# Patient Record
Sex: MALE | Race: WHITE | Employment: UNEMPLOYED | URBAN - METROPOLITAN AREA
[De-identification: names, ages, dates, MRNs, and addresses within clinical notes are randomized per-mention and may not be internally consistent; named-entity substitution may affect disease eponyms.]

---

## 2017-07-19 ENCOUNTER — HOSPITAL ENCOUNTER (EMERGENCY)
Age: 62
Discharge: HOME | End: 2017-07-19
Attending: EMERGENCY MEDICINE
Payer: MEDICAID

## 2017-07-19 ENCOUNTER — APPOINTMENT (OUTPATIENT)
Dept: CT IMAGING | Age: 62
End: 2017-07-19
Attending: PHYSICIAN ASSISTANT
Payer: MEDICAID

## 2017-07-19 VITALS
RESPIRATION RATE: 14 BRPM | HEART RATE: 82 BPM | WEIGHT: 176.59 LBS | HEIGHT: 69 IN | OXYGEN SATURATION: 98 % | DIASTOLIC BLOOD PRESSURE: 81 MMHG | SYSTOLIC BLOOD PRESSURE: 109 MMHG | BODY MASS INDEX: 26.16 KG/M2 | TEMPERATURE: 97.5 F

## 2017-07-19 DIAGNOSIS — M50.30 BULGE OF CERVICAL DISC WITHOUT MYELOPATHY: ICD-10-CM

## 2017-07-19 DIAGNOSIS — M50.30 DEGENERATIVE DISC DISEASE, CERVICAL: ICD-10-CM

## 2017-07-19 DIAGNOSIS — F17.200 NICOTINE DEPENDENCE, UNCOMPLICATED, UNSPECIFIED NICOTINE PRODUCT TYPE: ICD-10-CM

## 2017-07-19 DIAGNOSIS — M54.2 NECK PAIN: Primary | ICD-10-CM

## 2017-07-19 PROCEDURE — 72125 CT NECK SPINE W/O DYE: CPT

## 2017-07-19 PROCEDURE — 74011250637 HC RX REV CODE- 250/637: Performed by: PHYSICIAN ASSISTANT

## 2017-07-19 PROCEDURE — 99283 EMERGENCY DEPT VISIT LOW MDM: CPT

## 2017-07-19 PROCEDURE — L0120 CERV FLEX N/ADJ FOAM PRE OTS: HCPCS

## 2017-07-19 RX ORDER — ONDANSETRON 4 MG/1
4 TABLET, ORALLY DISINTEGRATING ORAL
Status: COMPLETED | OUTPATIENT
Start: 2017-07-19 | End: 2017-07-19

## 2017-07-19 RX ORDER — DIAZEPAM 5 MG/1
5 TABLET ORAL
Status: COMPLETED | OUTPATIENT
Start: 2017-07-19 | End: 2017-07-19

## 2017-07-19 RX ORDER — METHOCARBAMOL 750 MG/1
750 TABLET, FILM COATED ORAL 3 TIMES DAILY
Qty: 15 TAB | Refills: 0 | Status: SHIPPED | OUTPATIENT
Start: 2017-07-19 | End: 2017-07-24

## 2017-07-19 RX ORDER — HYDROMORPHONE HYDROCHLORIDE 2 MG/1
2 TABLET ORAL ONCE
Status: COMPLETED | OUTPATIENT
Start: 2017-07-19 | End: 2017-07-19

## 2017-07-19 RX ADMIN — ONDANSETRON 4 MG: 4 TABLET, ORALLY DISINTEGRATING ORAL at 19:03

## 2017-07-19 RX ADMIN — HYDROMORPHONE HYDROCHLORIDE 2 MG: 2 TABLET ORAL at 19:03

## 2017-07-19 RX ADMIN — DIAZEPAM 5 MG: 5 TABLET ORAL at 19:03

## 2017-07-19 NOTE — ED NOTES
Patient stated that he has 10/10 neck pain that started around 2 pm on 7/19/17. He denies any action to cause the neck pain. Patient reports that it is painful to hold head upright. Patient takes Dilaudid 4 mg x 5 per day for chronic back pain. Lat dose of dilaudid was taken around 12 pm on 7/19/17.

## 2017-07-19 NOTE — ED PROVIDER NOTES
HPI Comments: Jocelin Burch, 58 y.o. male, with a history of multiple lumbar surgeries presents ambulatory to [address] [facility] ED with cc of stabbing neck pain x 14:00 today. Patient states his pain is not improved with Dilaudid 4 mg, five times a day, and notes that it is worse with any head movements. He reports no associated sxs with his neck pain. He specifically denies h/o surgeries on his neck or any recent injuries. Patient states his pain management specialist prescribes his Dilaudid. He did not take any Dilaudid PTA. Patient notes baseline LE numbness. Patient denies UE numbness, CP, SOB, abdominal pain, N/V/D, and ha. PCP: Sharla Turcios MD  Orthopedic surgeon: Du Park MD    PMHx significant for: HTN, arthritis, GERD, hepatitis, chronic back pain, depression, anxiety  PSHx significant for: lumbar spine surgery x 7  Social history significant for: + Tobacco, - (h/o abuse) EtOH, - Illicit Drug Use    There are no other complaints, changes, or physical findings at this time. Written by Scooter Burgos ED Scribe, as dictated by William Tan PA-C. The history is provided by the patient. No  was used.         Past Medical History:   Diagnosis Date    Arthritis     Chronic pain     GERD (gastroesophageal reflux disease)     Hypertension     Liver disease 1970'S    HX HEPATITIS    Nausea & vomiting     Psychiatric disorder     DEPRESSION AND ANXIETY       Past Surgical History:   Procedure Laterality Date    ENDOSCOPY VISIT-OUTPATIENT  2003    HX BACK SURGERY      LUMBAR SPINE X 7         Family History:   Problem Relation Age of Onset    Cancer Father      BRAIN    Heart Disease Maternal Aunt     Stroke Maternal Aunt     Heart Disease Maternal Uncle     Stroke Maternal Uncle     Cancer Paternal Uncle      PROSTATE    Stroke Maternal Grandmother     Cancer Paternal Grandmother      BREAST    Heart Disease Sister     Heart Disease Sister        Social History     Social History    Marital status:      Spouse name: N/A    Number of children: N/A    Years of education: N/A     Occupational History    Not on file. Social History Main Topics    Smoking status: Current Every Day Smoker     Packs/day: 1.00     Years: 40.00    Smokeless tobacco: Never Used    Alcohol use No      Comment: RECOVERING ALCOHOLIC    Drug use: No    Sexual activity: Not on file     Other Topics Concern    Not on file     Social History Narrative         ALLERGIES: Latex; Adhesive; Codeine; and Morphine    Review of Systems   Constitutional: Negative. Negative for chills and fever. HENT: Negative. Negative for rhinorrhea and sore throat. Eyes: Negative. Negative for visual disturbance. Respiratory: Negative. Negative for cough, chest tightness, shortness of breath and wheezing. Cardiovascular: Negative. Negative for chest pain and palpitations. Gastrointestinal: Negative. Negative for abdominal pain, constipation, diarrhea, nausea and vomiting. Genitourinary: Negative. Negative for dysuria and hematuria. Musculoskeletal: Positive for neck pain. Negative for arthralgias and myalgias. Skin: Negative. Negative for rash. Allergic/Immunologic: Negative. Negative for environmental allergies and food allergies. Neurological: Negative for dizziness and headaches. Psychiatric/Behavioral: Negative. Negative for suicidal ideas. Vitals:    07/19/17 1744   BP: 109/81   Pulse: 82   Resp: 14   Temp: 97.5 °F (36.4 °C)   SpO2: 98%   Weight: 80.1 kg (176 lb 9.4 oz)   Height: 5' 9\" (1.753 m)            Physical Exam   Constitutional: He is oriented to person, place, and time. He appears well-developed and well-nourished. No distress. Pt is a  M, awake and alert in NAD. HENT:   Head: Normocephalic and atraumatic.    Right Ear: Tympanic membrane, external ear and ear canal normal.   Left Ear: Tympanic membrane, external ear and ear canal normal.   Nose: Nose normal.   Mouth/Throat: Uvula is midline, oropharynx is clear and moist and mucous membranes are normal.   Eyes: Conjunctivae and EOM are normal. Right eye exhibits no discharge. Left eye exhibits no discharge. Pinpoint pupils b/l. Neck:   Diffuse midline and paraspinal TTP of cervical region. Palpable spasm b/l. Able to flex and extend the neck, able to hold up head. Decreased twisting side to side ROM secondary to pain. Extension and twisting ROM causes pain. Cardiovascular: Normal rate, normal heart sounds and intact distal pulses. Pulmonary/Chest: Effort normal and breath sounds normal. No respiratory distress. He has no wheezes. He has no rales. He exhibits no tenderness. Abdominal: Soft. Bowel sounds are normal. There is no tenderness. There is no guarding. No CVA tenderness b/l. Musculoskeletal: He exhibits tenderness. He exhibits no edema. See neck   Neurological: He is alert and oriented to person, place, and time. No focal neuro deficits. Neuro intact of UE and LE B/L, Sensation intact of UE. Strength 5/5 of UE B/L.   2+ radial pulses b/l. Skin: Skin is warm and dry. No rash noted. He is not diaphoretic. No erythema. No pallor. Psychiatric: He has a normal mood and affect. His behavior is normal.   Vitals reviewed.      MDM  Number of Diagnoses or Management Options  Bulge of cervical disc without myelopathy:   Degenerative disc disease, cervical:   Neck pain:   Nicotine dependence, uncomplicated, unspecified nicotine product type:   Diagnosis management comments:   Ddx: OA, DDD, bulging disc, muscle spasm, chronic pain, strain, sprain, acute torticollis       Amount and/or Complexity of Data Reviewed  Tests in the radiology section of CPT®: ordered and reviewed  Review and summarize past medical records: yes  Discuss the patient with other providers: yes (Attending physician)    Patient Progress  Patient progress: stable    ED Course       Procedures    Progress Note:  7:08 PM  Patient reevaluated and updated on place in CT queue. Patient states he feels fine at this time. No new complaints. Written by Natalie Hdez ED Scribe, as dictated by Esteban Palomares PA-C. Progress Note:  8:29 PM  Updated and counseled pt on results of CT. Written by Natalie Hdez ED Scribjorden, as dictated by Esteban Palomares PA-C. Consult Note:  8:32 PM  Esteban Palomares PA-C spoke with Lamont Segal MD,  Specialty: attending physician  Discussed pt's hx, disposition, and available diagnostic and imaging results. Reviewed care plans. Consultant agrees with plans as outlined. Dr. Enmanuel Yarbrough suggests referring the pt back to ortho and placing the pt in a soft CC. Written by LOBITO Rosa, as dictated by Esteban Palomares PA-C. Progress Note:  8:32 PM  Offered pt soft CC for discharge home. Patient agreeable with CC. Discussed precautions and advised pt to avoid mixing muscle relaxer with anxiolytic rx and additional pain medications. Patient expresses understanding and agrees with plan. Addressed questions. Advised follow up with ortho in 1-2 days. Written by LOBITO Rosaibe, as dictated by Esteban Palomares PA-C. IMAGING RESULTS:  CT Results  (Last 48 hours)               [birth year]/1955  CT SPINE CERV WO CONT Final result    Impression:  IMPRESSION:       1. No acute bony abnormality of the cervical spine. 2. Multilevel degenerative disc disease with focal central and left paracentral   bulge at C2-3.   3. Bulky spondylosis at C3-4 causing significant left foraminal and left lateral   recess stenosis and mild AP stenosis. Narrative:  EXAM:  CT CERVICAL SPINE WITHOUT CONTRAST       INDICATION:   neck pain x today, denies known injury. H/o chronic back pain. COMPARISON: None. CONTRAST:  None. TECHNIQUE: Multislice helical CT of the cervical spine was performed without   intravenous contrast administration. Sagittal and coronal reconstructions were   generated.   CT dose reduction was achieved through use of a standardized   protocol tailored for this examination and automatic exposure control for dose   modulation. FINDINGS:       The alignment is within normal limits. There is no fracture or subluxation. The   odontoid process is intact. The craniocervical junction is within normal limits. The prevertebral soft tissues are within normal limits. Multilevel spondylosis and bulge, with bulky spondylosis at C3-4, causing   significant left foraminal and left lateral recess stenosis with mild AP   stenosis. . Central and left paracentral focal bulge of disc material at C2-3. MEDICATIONS GIVEN:  Medications   ondansetron (ZOFRAN ODT) tablet 4 mg (4 mg Oral Given 7/19/17 1903)   diazePAM (VALIUM) tablet 5 mg (5 mg Oral Given 7/19/17 1903)   HYDROmorphone (DILAUDID) tablet 2 mg (2 mg Oral Given 7/19/17 1903)       IMPRESSION:  1. Neck pain    2. Degenerative disc disease, cervical    3. Bulge of cervical disc without myelopathy    4. Nicotine dependence, uncomplicated, unspecified nicotine product type        PLAN:  1. Current Discharge Medication List      START taking these medications    Details   methocarbamol (ROBAXIN-750) 750 mg tablet Take 1 Tab by mouth three (3) times daily for 5 days. Qty: 15 Tab, Refills: 0         CONTINUE these medications which have NOT CHANGED    Details   tamsulosin (FLOMAX) 0.4 mg capsule Take 0.4 mg by mouth daily. prasugrel (EFFIENT) 10 mg tablet Take 10 mg by mouth daily. atorvastatin (LIPITOR) 80 mg tablet Take 80 mg by mouth daily. zolpidem (AMBIEN) 10 mg tablet Take 10 mg by mouth nightly as needed for Sleep.      lisinopril (PRINIVIL, ZESTRIL) 10 mg tablet Take 10 mg by mouth daily. omega-3 fatty acids-vitamin e (FISH OIL) 1,000 mg Cap Take 1 Cap by mouth two (2) times a day. cholecalciferol, vitamin d3, (VITAMIN D3) 1,000 unit tablet Take 1,000 Units by mouth daily.         ALPRAZolam Hai Ortega) 2 mg tablet Take 2 mg by mouth two (2) times a day. Omeprazole delayed release (PRILOSEC D/R) 20 mg tablet Take 20 mg by mouth every morning. 2.   Follow-up Information     Follow up With Details Comments Contact Info    Elvin Bar MD Schedule an appointment as soon as possible for a visit in 1 day  [address] EMERGENCY DEPT  As needed or, If symptoms worsen [address]  [address] [facility]  [phone]        Return to ED if worse     Discharge Note:  8:38 PM  The pt is ready for discharge. The pt's signs, symptoms, diagnosis, and discharge instructions have been discussed and pt has conveyed their understanding. The pt is to follow up as recommended or return to ER should their symptoms worsen. Plan has been discussed and pt is in agreement. This note is prepared by Maureen Nixon, acting as a Scribe for Abelino Myers PA-C. Abelino Myers PA-C: The scribe's documentation has been prepared under my direction and personally reviewed by me in its entirety. I confirm that the notes above accurately reflects all work, treatment, procedures, and medical decision making performed by me. This note will not be viewable in [address]

## 2017-07-20 NOTE — DISCHARGE INSTRUCTIONS
Neck Pain: Care Instructions  Your Care Instructions  You can have neck pain anywhere from the bottom of your head to the top of your shoulders. It can spread to the upper back or arms. Injuries, painting a ceiling, sleeping with your neck twisted, staying in one position for too long, and many other activities can cause neck pain. Most neck pain gets better with home care. Your doctor may recommend medicine to relieve pain or relax your muscles. He or she may suggest exercise and physical therapy to increase flexibility and relieve stress. You may need to wear a special (cervical) collar to support your neck for a day or two. Follow-up care is a key part of your treatment and safety. Be sure to make and go to all appointments, and call your doctor if you are having problems. It's also a good idea to know your test results and keep a list of the medicines you take. How can you care for yourself at home? · Try using a heating pad on a low or medium setting for 15 to 20 minutes every 2 or 3 hours. Try a warm shower in place of one session with the heating pad. · You can also try an ice pack for 10 to 15 minutes every 2 to 3 hours. Put a thin cloth between the ice and your skin. · Take pain medicines exactly as directed. ¨ If the doctor gave you a prescription medicine for pain, take it as prescribed. ¨ If you are not taking a prescription pain medicine, ask your doctor if you can take an over-the-counter medicine. · If your doctor recommends a cervical collar, wear it exactly as directed. When should you call for help? Call your doctor now or seek immediate medical care if:  · You have new or worsening numbness in your arms, buttocks or legs. · You have new or worsening weakness in your arms or legs. (This could make it hard to stand up.)  · You lose control of your bladder or bowels.   Watch closely for changes in your health, and be sure to contact your doctor if:  · Your neck pain is getting worse.  · You are not getting better after 1 week. · You do not get better as expected. Where can you learn more? Go to http://blayne-hector.info/. Enter [id number] in the search box to learn more about \"Neck Pain: Care Instructions. \"  Current as of: March 21, 2017  Content Version: 11.3  © 3176-5970 [employer]. Care instructions adapted under license by [employer] (which disclaims liability or warranty for this information). If you have questions about a medical condition or this instruction, always ask your healthcare professional. [place] [address] any warranty or liability for your use of this information.

## 2017-07-20 NOTE — ED NOTES
Soft C-collar applied. Discharge instructions given to patient by MATTEO Brewer. Pt verbalized understanding of discharge instructions. Pt discharged without difficulty. Pt discharged in stable condition via ambulation, accompanied by family.

## 2017-07-23 ENCOUNTER — HOSPITAL ENCOUNTER (EMERGENCY)
Age: 62
Discharge: HOME | End: 2017-07-23
Attending: EMERGENCY MEDICINE | Admitting: EMERGENCY MEDICINE
Payer: MEDICAID

## 2017-07-23 VITALS
TEMPERATURE: 98.7 F | OXYGEN SATURATION: 97 % | HEART RATE: 50 BPM | RESPIRATION RATE: 16 BRPM | BODY MASS INDEX: 26.66 KG/M2 | HEIGHT: 69 IN | DIASTOLIC BLOOD PRESSURE: 64 MMHG | SYSTOLIC BLOOD PRESSURE: 104 MMHG | WEIGHT: 180 LBS

## 2017-07-23 DIAGNOSIS — M50.30 BULGE OF CERVICAL DISC WITHOUT MYELOPATHY: Primary | ICD-10-CM

## 2017-07-23 PROCEDURE — 99282 EMERGENCY DEPT VISIT SF MDM: CPT

## 2017-07-23 PROCEDURE — 96374 THER/PROPH/DIAG INJ IV PUSH: CPT

## 2017-07-23 PROCEDURE — 96375 TX/PRO/DX INJ NEW DRUG ADDON: CPT

## 2017-07-23 PROCEDURE — 74011250636 HC RX REV CODE- 250/636: Performed by: NURSE PRACTITIONER

## 2017-07-23 RX ORDER — KETOROLAC TROMETHAMINE 30 MG/ML
30 INJECTION, SOLUTION INTRAMUSCULAR; INTRAVENOUS
Status: COMPLETED | OUTPATIENT
Start: 2017-07-23 | End: 2017-07-23

## 2017-07-23 RX ORDER — DEXAMETHASONE SODIUM PHOSPHATE 4 MG/ML
10 INJECTION, SOLUTION INTRA-ARTICULAR; INTRALESIONAL; INTRAMUSCULAR; INTRAVENOUS; SOFT TISSUE
Status: COMPLETED | OUTPATIENT
Start: 2017-07-23 | End: 2017-07-23

## 2017-07-23 RX ORDER — PREDNISONE 10 MG/1
TABLET ORAL
Qty: 21 TAB | Refills: 0 | Status: SHIPPED | OUTPATIENT
Start: 2017-07-23 | End: 2017-07-23

## 2017-07-23 RX ORDER — PREDNISONE 10 MG/1
TABLET ORAL
Qty: 21 TAB | Refills: 0 | Status: SHIPPED | OUTPATIENT
Start: 2017-07-23 | End: 2017-10-03

## 2017-07-23 RX ADMIN — DEXAMETHASONE SODIUM PHOSPHATE 10 MG: 4 INJECTION, SOLUTION INTRAMUSCULAR; INTRAVENOUS at 18:34

## 2017-07-23 RX ADMIN — KETOROLAC TROMETHAMINE 30 MG: 30 INJECTION, SOLUTION INTRAMUSCULAR at 18:34

## 2017-07-23 NOTE — ED TRIAGE NOTES
Pt reports having chronic neck pain due to having a herniate disc. Pt was seen at [address] [facility] on 7/19/2017 for same symptoms ans was given prescription for a muscle relaxer. Pt states the pain is worse. Pt currently is taking Dilaudid 4 mg PO 5 times a day and Xanax 2 mg PO 4 times a day for chronic back pain. Medication is prescribed Dr Fabienne Leal.

## 2017-07-23 NOTE — DISCHARGE INSTRUCTIONS
Cervical Disc Disease: Care Instructions  Your Care Instructions    Cervical disc disease results from damage, disease, or wear and tear to the discs between the bones (vertebra) in your neck. The discs act as shock absorbers for the spine and keep the spine flexible. When a disc is damaged, it can bulge out and press against the nerve roots or spinal cord. This is sometimes called a herniated or \"slipped disc. \" This pressure can cause pain and numbness or tingling in your arms and hands. It can also cause weakness in your legs. An accident can damage a disc and cause it to break open (rupture). Aging and hard physical work can also cause damage to cervical discs. The first treatments for cervical disc disease include physical therapy, special neck exercises, heat, and pain medicine. If these fail, your doctor may inject steroids and pain medicine into your neck. Surgery is usually done only if other treatments have not worked. Follow-up care is a key part of your treatment and safety. Be sure to make and go to all appointments, and call your doctor if you are having problems. It's also a good idea to know your test results and keep a list of the medicines you take. How can you care for yourself at home? · Take pain medicines exactly as directed. ¨ If the doctor gave you a prescription medicine for pain, take it as prescribed. ¨ If you are not taking a prescription pain medicine, ask your doctor if you can take an over-the-counter medicine. · Don't spend too long in one position. Take short breaks to move around and change positions. · Wear a seat belt and shoulder harness when you are in a car. · Sleep with a pillow under your head and neck that keeps your neck straight. · Follow your doctor's instructions for gentle neck-stretching exercises. · Do not smoke. Smoking can slow healing of your discs. If you need help quitting, talk to your doctor about stop-smoking programs and medicines.  These can increase your chances of quitting for good. · Avoid strenuous work or exercise until your doctor says it is okay. When should you call for help? Call 911 anytime you think you may need emergency care. For example, call if:  · You are unable to move an arm or a leg at all. Call your doctor now or seek immediate medical care if:  · You have new or worse symptoms in your arms, legs, chest, belly, or buttocks. Symptoms may include:  ¨ Numbness or tingling. ¨ Weakness. ¨ Pain. · You lose bladder or bowel control. Watch closely for changes in your health, and be sure to contact your doctor if:  · You are not getting better as expected. Where can you learn more? Go to http://blayne-hector.info/. Enter N118 in the search box to learn more about \"Cervical Disc Disease: Care Instructions. \"  Current as of: March 21, 2017  Content Version: 11.3  © 2111-2829 [employer]. Care instructions adapted under license by [employer] (which disclaims liability or warranty for this information). If you have questions about a medical condition or this instruction, always ask your healthcare professional. [place] [address] any warranty or liability for your use of this information. We hope that we have addressed all of your medical concerns. The examination and treatment you received in the Emergency Department were for an emergent problem and were not intended as complete care. It is important that you follow up with your healthcare provider(s) for ongoing care. If your symptoms worsen or do not improve as expected, and you are unable to reach your usual health care provider(s), you should return to the Emergency Department. Today's healthcare is undergoing tremendous change, and patient satisfaction surveys are one of the many tools to assess the quality of medical care.   You may receive a survey from the [employer] regarding your experience in the Emergency Department. I hope that your experience has been completely positive, particularly the medical care that I provided. As such, please participate in the survey; anything less than excellent does not meet my expectations or intentions. [address] and [facility] participate in nationally recognized quality of care measures. If your blood pressure is greater than 120/80, as reported below, we urge that you seek medical care to address the potential of high blood pressure, commonly known as hypertension. Hypertension can be hereditary or can be caused by certain medical conditions, pain, stress, or \"white coat syndrome. \"       Please make an appointment with your health care provider(s) for follow up of your Emergency Department visit. VITALS:   Patient Vitals for the past 8 hrs:   Temp Pulse Resp BP SpO2   07/23/17 1617 98.1 °F (36.7 °C) 81 16 132/71 97 %          Thank you for allowing us to provide you with medical care today. We realize that you have many choices for your emergency care needs. Please choose us in the future for any continued health care needs. Tom Aaron, [address]: [phone]            No results found for this or any previous visit (from the past 24 hour(s)). No results found.

## 2017-07-23 NOTE — LETTER
[address] 55 
[address] [facility] [address] 
[phone] Work/School Note Date: 7/23/2017 To Whom It May concern: 
 
Olga Chavez was seen and treated today in the emergency room by the following provider(s): 
Attending Provider: Laura Villafuerte MD 
Nurse Practitioner: Tara Dunn NP. Olga Chavez may return to work on 7/24/17.  
 
Sincerely, 
 
 
 
 
Renetta Olivier RN

## 2017-07-23 NOTE — ED PROVIDER NOTES
HPI Comments: The pt is a 58year old male who presents with complaints of ongoing left sided neck pain which is refractory to dilaudid and robaxin. Seen in the ED on 7/19 for the same. Ct cervical showed no acute bony abnormality of the cervical spine, Multilevel degenerative disc disease with focal central and left paracentral bulge at C2-3, and Bulky spondylosis at C3-4 causing significant left foraminal and left lateral recess stenosis and mild AP stenosis. He was instructed to follow up with Dr. Kurt Oglesby MD but he wasn't able to get an appointment. No distal numbness or tingling. No new injury, fall, or trauma. Pt denies fevers, chills, night sweats, chest pain, pressure, SOB, KELLEY, PND, orthopnea, abdominal pain, n/v/d, melena, hematuria, dysuria, constipation, HA, dizziness, and syncope. Past Medical History:  No date: Arthritis  No date: Chronic pain  No date: GERD (gastroesophageal reflux disease)  No date: Hypertension  1970'S: Liver disease      Comment: HX HEPATITIS  No date: Nausea & vomiting  No date: Psychiatric disorder      Comment: DEPRESSION AND ANXIETY    Past Surgical History:  2003: ENDOSCOPY VISIT-OUTPATIENT  No date: HX BACK SURGERY      Comment: LUMBAR SPINE X 7    PCP:  Prisca Brink MD          Patient is a 58 y.o. male presenting with neck pain. The history is provided by the patient. Neck Pain    This is a chronic problem. Pertinent negatives include no chest pain, no numbness, no headaches and no weakness.         Past Medical History:   Diagnosis Date    Arthritis     Chronic pain     GERD (gastroesophageal reflux disease)     Hypertension     Liver disease 1970'S    HX HEPATITIS    Nausea & vomiting     Psychiatric disorder     DEPRESSION AND ANXIETY       Past Surgical History:   Procedure Laterality Date    ENDOSCOPY VISIT-OUTPATIENT  2003    HX BACK SURGERY      LUMBAR SPINE X 7         Family History:   Problem Relation Age of Onset    Cancer Father BRAIN    Heart Disease Maternal Aunt     Stroke Maternal Aunt     Heart Disease Maternal Uncle     Stroke Maternal Uncle     Cancer Paternal Uncle      PROSTATE    Stroke Maternal Grandmother     Cancer Paternal Grandmother      BREAST    Heart Disease Sister     Heart Disease Sister        Social History     Social History    Marital status:      Spouse name: N/A    Number of children: N/A    Years of education: N/A     Occupational History    Not on file. Social History Main Topics    Smoking status: Current Every Day Smoker     Packs/day: 1.00     Years: 40.00    Smokeless tobacco: Never Used    Alcohol use No      Comment: RECOVERING ALCOHOLIC    Drug use: No    Sexual activity: Not on file     Other Topics Concern    Not on file     Social History Narrative         ALLERGIES: Latex; Adhesive; Codeine; and Morphine    Review of Systems   Constitutional: Negative for activity change, appetite change, chills, diaphoresis, fatigue, fever and unexpected weight change. HENT: Negative for congestion, ear pain, rhinorrhea, sinus pressure, sore throat, tinnitus, trouble swallowing and voice change. Eyes: Negative for pain, discharge, redness and visual disturbance. Respiratory: Negative for apnea, cough, choking, chest tightness, shortness of breath, wheezing and stridor. Cardiovascular: Negative for chest pain, palpitations and leg swelling. Gastrointestinal: Negative for abdominal pain, constipation, nausea and vomiting. Endocrine: Negative for cold intolerance and heat intolerance. Genitourinary: Negative for difficulty urinating, dysuria, flank pain, hematuria, testicular pain and urgency. Musculoskeletal: Positive for neck pain. Negative for arthralgias, back pain, gait problem, joint swelling, myalgias and neck stiffness. Skin: Negative for color change, pallor, rash and wound. Allergic/Immunologic: Negative for immunocompromised state.    Neurological: Negative for dizziness, tremors, syncope, weakness, light-headedness, numbness and headaches. Hematological: Does not bruise/bleed easily. Psychiatric/Behavioral: Negative for agitation, confusion and suicidal ideas. Vitals:    07/23/17 1617 07/23/17 1919   BP: 132/71 104/64   Pulse: 81 (!) 50   Resp: 16 16   Temp: 98.1 °F (36.7 °C) 98.7 °F (37.1 °C)   SpO2: 97% 97%   Weight: 81.6 kg (180 lb)    Height: 5' 9\" (1.753 m)             Physical Exam   Constitutional: He is oriented to person, place, and time. He appears well-developed and well-nourished. No distress. HENT:   Head: Atraumatic. Nose: Nose normal.   Mouth/Throat: No oropharyngeal exudate. Eyes: Conjunctivae and EOM are normal. Right eye exhibits no discharge. Left eye exhibits no discharge. No scleral icterus. Neck: Normal range of motion. Neck supple. No JVD present. Spinous process tenderness and muscular tenderness present. No rigidity. No tracheal deviation, no edema, no erythema and normal range of motion present. No thyromegaly present. Cardiovascular: Normal rate and regular rhythm. Exam reveals no gallop and no friction rub. No murmur heard. Pulmonary/Chest: Breath sounds normal. No stridor. No respiratory distress. He has no wheezes. He has no rales. He exhibits no tenderness. Abdominal: Soft. Bowel sounds are normal. He exhibits no distension and no mass. There is no tenderness. There is no rebound and no guarding. Musculoskeletal: Normal range of motion. He exhibits no edema or tenderness. Thoracic back: Normal.        Lumbar back: Normal.   Lymphadenopathy:     He has no cervical adenopathy. Neurological: He is alert and oriented to person, place, and time. He has normal strength. No cranial nerve deficit or sensory deficit. He displays a negative Romberg sign. Coordination normal. GCS eye subscore is 4. GCS verbal subscore is 5. GCS motor subscore is 6.    Reflex Scores:       Tricep reflexes are 2+ on the right side and 2+ on the left side. Bicep reflexes are 2+ on the right side and 2+ on the left side. Skin: Skin is warm and dry. He is not diaphoretic. Psychiatric: He has a normal mood and affect. His behavior is normal.   Nursing note and vitals reviewed. MDM  Number of Diagnoses or Management Options  Diagnosis management comments:    * toradol and decadron   * consult to Ortho        Amount and/or Complexity of Data Reviewed  Tests in the radiology section of CPT®: ordered and reviewed    Risk of Complications, Morbidity, and/or Mortality  General comments:    - stable, ambulatory pt in NAD    Patient Progress  Patient progress: stable    ED Course       Procedures           CONSULT NOTE:   7:28 PM  Jaime Maciel NP spoke with Donny FELIPE via [facility] Text,   Specialty: Orthopedic Specialist  Discussed pt's hx, disposition, and available diagnostic and imaging results. Reviewed care plans. Consultant agrees with plans as outlined. Decadron and Toradol in ED. Prednisone at home and follow up with Dr. Suad nova. Jaime Maciel NP    7:29 PM  Pt has been reevaluated. There are no new complaints, changes, or physical findings at this time. Medications have been reviewed w/ pt and/or family. Pt and/or family's questions have been answered. Pt and/or family expressed good understanding of the dx/tx/rx and is in agreement with plan of care. Pt instructed and agreed to f/u w/ PCP and [facility] and to return to ED upon further deterioration. Pt is ready for discharge. LABORATORY TESTS:  No results found for this or any previous visit (from the past 12 hour(s)). IMAGING RESULTS:  No orders to display     No results found.       MEDICATIONS GIVEN:  Medications   ketorolac (TORADOL) injection 30 mg (30 mg IntraVENous Given 7/23/17 1834)   dexamethasone (DECADRON) 4 mg/mL injection 10 mg (10 mg IntraVENous Given 7/23/17 1834)       IMPRESSION:  1. Bulge of cervical disc without myelopathy PLAN:  1. Discharge Medication List as of 7/23/2017  7:01 PM      START taking these medications    Details   predniSONE (STERAPRED DS) 10 mg dose pack Taper as directed, Normal, Disp-21 Tab, R-0         CONTINUE these medications which have NOT CHANGED    Details   methocarbamol (ROBAXIN-750) 750 mg tablet Take 1 Tab by mouth three (3) times daily for 5 days. , Print, Disp-15 Tab, R-0      tamsulosin (FLOMAX) 0.4 mg capsule Take 0.4 mg by mouth daily. , Historical Med      prasugrel (EFFIENT) 10 mg tablet Take 10 mg by mouth daily. , Historical Med      atorvastatin (LIPITOR) 80 mg tablet Take 80 mg by mouth daily. , Historical Med      zolpidem (AMBIEN) 10 mg tablet Take 10 mg by mouth nightly as needed for Sleep., Historical Med      lisinopril (PRINIVIL, ZESTRIL) 10 mg tablet Take 10 mg by mouth daily. , Historical Med      ondansetron (ZOFRAN ODT) 4 mg disintegrating tablet Take 1 Tab by mouth every eight (8) hours as needed for Nausea. , Print, Disp-10 Tab, R-0      Omeprazole delayed release (PRILOSEC D/R) 20 mg tablet Take 20 mg by mouth every morning. Historical Med, 20 mg      omega-3 fatty acids-vitamin e (FISH OIL) 1,000 mg Cap Take 1 Cap by mouth two (2) times a day. Historical Med, 1 Cap      cholecalciferol, vitamin d3, (VITAMIN D3) 1,000 unit tablet Take 1,000 Units by mouth daily. Historical Med, 1,000 Units      ALPRAZolam (XANAX) 2 mg tablet Take 2 mg by mouth two (2) times a day. Historical Med, 2 mg           2. Follow-up Information     Follow up With Details Comments [address], MD In 2 days  [address] [facility] [address] [place] [address]      Myranda Dinh MD In 1 day  [place] [address]  [phone]      [facility] [place] [facility]  As needed, If symptoms worsen [address] [facility]  [phone]        3.  Supportive care     Return to ED if worse         Melanie Florez NP  7:30 PM

## 2017-10-01 ENCOUNTER — APPOINTMENT (OUTPATIENT)
Dept: GENERAL RADIOLOGY | Age: 62
DRG: 720 | End: 2017-10-01
Attending: EMERGENCY MEDICINE
Payer: MEDICAID

## 2017-10-01 ENCOUNTER — HOSPITAL ENCOUNTER (INPATIENT)
Age: 62
LOS: 2 days | Discharge: HOME | DRG: 720 | End: 2017-10-03
Attending: EMERGENCY MEDICINE | Admitting: INTERNAL MEDICINE
Payer: MEDICAID

## 2017-10-01 DIAGNOSIS — R65.21 SEPTIC SHOCK (HCC): Primary | ICD-10-CM

## 2017-10-01 DIAGNOSIS — A41.9 SEPTIC SHOCK (HCC): Primary | ICD-10-CM

## 2017-10-01 DIAGNOSIS — J18.9 PNEUMONIA DUE TO INFECTIOUS ORGANISM, UNSPECIFIED LATERALITY, UNSPECIFIED PART OF LUNG: ICD-10-CM

## 2017-10-01 PROBLEM — K76.9 LIVER DISEASE: Status: ACTIVE | Noted: 2017-10-01

## 2017-10-01 PROBLEM — I10 HTN (HYPERTENSION): Status: ACTIVE | Noted: 2017-10-01

## 2017-10-01 PROBLEM — F99 PSYCHIATRIC DISORDER: Status: ACTIVE | Noted: 2017-10-01

## 2017-10-01 PROBLEM — G89.29 CHRONIC PAIN: Status: ACTIVE | Noted: 2017-10-01

## 2017-10-01 LAB
ABO + RH BLD: NORMAL
ALBUMIN SERPL-MCNC: 2.3 G/DL (ref 3.5–5)
ALBUMIN/GLOB SERPL: 0.5 {RATIO} (ref 1.1–2.2)
ALP SERPL-CCNC: 124 U/L (ref 45–117)
ALT SERPL-CCNC: 74 U/L (ref 12–78)
ANION GAP SERPL CALC-SCNC: 7 MMOL/L (ref 5–15)
APTT PPP: 27.1 SEC (ref 22.1–32.5)
AST SERPL-CCNC: 60 U/L (ref 15–37)
BASOPHILS # BLD: 0 K/UL (ref 0–0.1)
BASOPHILS NFR BLD: 0 % (ref 0–1)
BILIRUB SERPL-MCNC: 0.9 MG/DL (ref 0.2–1)
BLOOD GROUP ANTIBODIES SERPL: NORMAL
BUN SERPL-MCNC: 13 MG/DL (ref 6–20)
BUN/CREAT SERPL: 14 (ref 12–20)
CALCIUM SERPL-MCNC: 8.6 MG/DL (ref 8.5–10.1)
CHLORIDE SERPL-SCNC: 103 MMOL/L (ref 97–108)
CK MB CFR SERPL CALC: ABNORMAL % (ref 0–2.5)
CK MB SERPL-MCNC: <1 NG/ML (ref 5–25)
CK SERPL-CCNC: 21 U/L (ref 39–308)
CO2 SERPL-SCNC: 25 MMOL/L (ref 21–32)
CREAT SERPL-MCNC: 0.96 MG/DL (ref 0.7–1.3)
DIFFERENTIAL METHOD BLD: ABNORMAL
EOSINOPHIL # BLD: 0 K/UL (ref 0–0.4)
EOSINOPHIL NFR BLD: 0 % (ref 0–7)
ERYTHROCYTE [DISTWIDTH] IN BLOOD BY AUTOMATED COUNT: 14.3 % (ref 11.5–14.5)
FERRITIN SERPL-MCNC: 399 NG/ML (ref 26–388)
FOLATE SERPL-MCNC: 10.4 NG/ML (ref 5–21)
GLOBULIN SER CALC-MCNC: 5 G/DL (ref 2–4)
GLUCOSE BLD STRIP.AUTO-MCNC: 148 MG/DL (ref 65–100)
GLUCOSE SERPL-MCNC: 139 MG/DL (ref 65–100)
HCT VFR BLD AUTO: 39.2 % (ref 36.6–50.3)
HEMOCCULT STL QL: NEGATIVE
HGB BLD-MCNC: 14 G/DL (ref 12.1–17)
INR PPP: 1.2 (ref 0.9–1.1)
IRON SATN MFR SERPL: 14 % (ref 20–50)
IRON SERPL-MCNC: 28 UG/DL (ref 35–150)
LACTATE SERPL-SCNC: 1 MMOL/L (ref 0.4–2)
LIPASE SERPL-CCNC: 166 U/L (ref 73–393)
LYMPHOCYTES # BLD: 0.7 K/UL (ref 0.8–3.5)
LYMPHOCYTES NFR BLD: 7 % (ref 12–49)
MAGNESIUM SERPL-MCNC: 2 MG/DL (ref 1.6–2.4)
MCH RBC QN AUTO: 32.1 PG (ref 26–34)
MCHC RBC AUTO-ENTMCNC: 35.7 G/DL (ref 30–36.5)
MCV RBC AUTO: 89.9 FL (ref 80–99)
MONOCYTES # BLD: 0.7 K/UL (ref 0–1)
MONOCYTES NFR BLD: 7 % (ref 5–13)
NEUTS SEG # BLD: 9.1 K/UL (ref 1.8–8)
NEUTS SEG NFR BLD: 86 % (ref 32–75)
PLATELET # BLD AUTO: 169 K/UL (ref 150–400)
POTASSIUM SERPL-SCNC: 4 MMOL/L (ref 3.5–5.1)
PROT SERPL-MCNC: 7.3 G/DL (ref 6.4–8.2)
PROTHROMBIN TIME: 11.9 SEC (ref 9–11.1)
RBC # BLD AUTO: 4.36 M/UL (ref 4.1–5.7)
RBC MORPH BLD: ABNORMAL
SERVICE CMNT-IMP: ABNORMAL
SODIUM SERPL-SCNC: 135 MMOL/L (ref 136–145)
SPECIMEN EXP DATE BLD: NORMAL
THERAPEUTIC RANGE,PTTT: NORMAL SECS (ref 58–77)
TIBC SERPL-MCNC: 202 UG/DL (ref 250–450)
TROPONIN I SERPL-MCNC: <0.04 NG/ML
VIT B12 SERPL-MCNC: 612 PG/ML (ref 211–911)
WBC # BLD AUTO: 10.5 K/UL (ref 4.1–11.1)

## 2017-10-01 PROCEDURE — 82962 GLUCOSE BLOOD TEST: CPT

## 2017-10-01 PROCEDURE — 84484 ASSAY OF TROPONIN QUANT: CPT | Performed by: EMERGENCY MEDICINE

## 2017-10-01 PROCEDURE — 96361 HYDRATE IV INFUSION ADD-ON: CPT

## 2017-10-01 PROCEDURE — 80053 COMPREHEN METABOLIC PANEL: CPT | Performed by: EMERGENCY MEDICINE

## 2017-10-01 PROCEDURE — 85730 THROMBOPLASTIN TIME PARTIAL: CPT | Performed by: EMERGENCY MEDICINE

## 2017-10-01 PROCEDURE — 74011250636 HC RX REV CODE- 250/636: Performed by: EMERGENCY MEDICINE

## 2017-10-01 PROCEDURE — 74011000250 HC RX REV CODE- 250: Performed by: EMERGENCY MEDICINE

## 2017-10-01 PROCEDURE — 96375 TX/PRO/DX INJ NEW DRUG ADDON: CPT

## 2017-10-01 PROCEDURE — 83735 ASSAY OF MAGNESIUM: CPT | Performed by: EMERGENCY MEDICINE

## 2017-10-01 PROCEDURE — 87040 BLOOD CULTURE FOR BACTERIA: CPT | Performed by: EMERGENCY MEDICINE

## 2017-10-01 PROCEDURE — 71010 XR CHEST PORT: CPT

## 2017-10-01 PROCEDURE — 86900 BLOOD TYPING SEROLOGIC ABO: CPT | Performed by: EMERGENCY MEDICINE

## 2017-10-01 PROCEDURE — 82728 ASSAY OF FERRITIN: CPT | Performed by: EMERGENCY MEDICINE

## 2017-10-01 PROCEDURE — 82746 ASSAY OF FOLIC ACID SERUM: CPT | Performed by: EMERGENCY MEDICINE

## 2017-10-01 PROCEDURE — 71020 XR CHEST PA LAT: CPT

## 2017-10-01 PROCEDURE — 83690 ASSAY OF LIPASE: CPT | Performed by: EMERGENCY MEDICINE

## 2017-10-01 PROCEDURE — 96374 THER/PROPH/DIAG INJ IV PUSH: CPT

## 2017-10-01 PROCEDURE — 02HV33Z INSERTION OF INFUSION DEVICE INTO SUPERIOR VENA CAVA, PERCUTANEOUS APPROACH: ICD-10-PCS | Performed by: EMERGENCY MEDICINE

## 2017-10-01 PROCEDURE — 99285 EMERGENCY DEPT VISIT HI MDM: CPT

## 2017-10-01 PROCEDURE — 82272 OCCULT BLD FECES 1-3 TESTS: CPT | Performed by: EMERGENCY MEDICINE

## 2017-10-01 PROCEDURE — 77030029684 HC NEB SM VOL KT MONA -A

## 2017-10-01 PROCEDURE — 74011000250 HC RX REV CODE- 250: Performed by: INTERNAL MEDICINE

## 2017-10-01 PROCEDURE — 80074 ACUTE HEPATITIS PANEL: CPT | Performed by: INTERNAL MEDICINE

## 2017-10-01 PROCEDURE — 83540 ASSAY OF IRON: CPT | Performed by: EMERGENCY MEDICINE

## 2017-10-01 PROCEDURE — 36415 COLL VENOUS BLD VENIPUNCTURE: CPT | Performed by: EMERGENCY MEDICINE

## 2017-10-01 PROCEDURE — 82550 ASSAY OF CK (CPK): CPT | Performed by: EMERGENCY MEDICINE

## 2017-10-01 PROCEDURE — 85610 PROTHROMBIN TIME: CPT | Performed by: EMERGENCY MEDICINE

## 2017-10-01 PROCEDURE — 74011000258 HC RX REV CODE- 258: Performed by: EMERGENCY MEDICINE

## 2017-10-01 PROCEDURE — C1751 CATH, INF, PER/CENT/MIDLINE: HCPCS

## 2017-10-01 PROCEDURE — 74011250637 HC RX REV CODE- 250/637: Performed by: INTERNAL MEDICINE

## 2017-10-01 PROCEDURE — 85025 COMPLETE CBC W/AUTO DIFF WBC: CPT | Performed by: EMERGENCY MEDICINE

## 2017-10-01 PROCEDURE — 65660000000 HC RM CCU STEPDOWN

## 2017-10-01 PROCEDURE — 75810000137 HC PLCMT CENT VENOUS CATH

## 2017-10-01 PROCEDURE — 94640 AIRWAY INHALATION TREATMENT: CPT

## 2017-10-01 PROCEDURE — 83605 ASSAY OF LACTIC ACID: CPT | Performed by: EMERGENCY MEDICINE

## 2017-10-01 PROCEDURE — 74011250637 HC RX REV CODE- 250/637: Performed by: EMERGENCY MEDICINE

## 2017-10-01 PROCEDURE — 93005 ELECTROCARDIOGRAM TRACING: CPT

## 2017-10-01 PROCEDURE — C9113 INJ PANTOPRAZOLE SODIUM, VIA: HCPCS | Performed by: EMERGENCY MEDICINE

## 2017-10-01 PROCEDURE — 82607 VITAMIN B-12: CPT | Performed by: EMERGENCY MEDICINE

## 2017-10-01 PROCEDURE — 74011250636 HC RX REV CODE- 250/636: Performed by: INTERNAL MEDICINE

## 2017-10-01 RX ORDER — ACETAMINOPHEN 325 MG/1
650 TABLET ORAL
Status: DISCONTINUED | OUTPATIENT
Start: 2017-10-01 | End: 2017-10-03 | Stop reason: HOSPADM

## 2017-10-01 RX ORDER — MECLIZINE HCL 12.5 MG 12.5 MG/1
25 TABLET ORAL
Status: COMPLETED | OUTPATIENT
Start: 2017-10-01 | End: 2017-10-01

## 2017-10-01 RX ORDER — PRASUGREL 10 MG/1
10 TABLET, FILM COATED ORAL DAILY
Status: DISCONTINUED | OUTPATIENT
Start: 2017-10-02 | End: 2017-10-03 | Stop reason: HOSPADM

## 2017-10-01 RX ORDER — IPRATROPIUM BROMIDE AND ALBUTEROL SULFATE 2.5; .5 MG/3ML; MG/3ML
3 SOLUTION RESPIRATORY (INHALATION)
Status: DISCONTINUED | OUTPATIENT
Start: 2017-10-01 | End: 2017-10-03

## 2017-10-01 RX ORDER — PANTOPRAZOLE SODIUM 40 MG/1
40 TABLET, DELAYED RELEASE ORAL
Status: DISCONTINUED | OUTPATIENT
Start: 2017-10-02 | End: 2017-10-03 | Stop reason: HOSPADM

## 2017-10-01 RX ORDER — PANTOPRAZOLE SODIUM 40 MG/10ML
40 INJECTION, POWDER, LYOPHILIZED, FOR SOLUTION INTRAVENOUS
Status: COMPLETED | OUTPATIENT
Start: 2017-10-01 | End: 2017-10-01

## 2017-10-01 RX ORDER — HYDROCODONE BITARTRATE AND ACETAMINOPHEN 7.5; 325 MG/1; MG/1
1 TABLET ORAL
Status: COMPLETED | OUTPATIENT
Start: 2017-10-01 | End: 2017-10-01

## 2017-10-01 RX ORDER — ENOXAPARIN SODIUM 100 MG/ML
40 INJECTION SUBCUTANEOUS EVERY 24 HOURS
Status: DISCONTINUED | OUTPATIENT
Start: 2017-10-01 | End: 2017-10-01

## 2017-10-01 RX ORDER — LEVOFLOXACIN 750 MG/1
750 TABLET ORAL
Status: COMPLETED | OUTPATIENT
Start: 2017-10-01 | End: 2017-10-01

## 2017-10-01 RX ORDER — MAGNESIUM SULFATE 100 %
4 CRYSTALS MISCELLANEOUS AS NEEDED
Status: DISCONTINUED | OUTPATIENT
Start: 2017-10-01 | End: 2017-10-03 | Stop reason: HOSPADM

## 2017-10-01 RX ORDER — SODIUM CHLORIDE 0.9 % (FLUSH) 0.9 %
5-10 SYRINGE (ML) INJECTION EVERY 8 HOURS
Status: DISCONTINUED | OUTPATIENT
Start: 2017-10-01 | End: 2017-10-03 | Stop reason: HOSPADM

## 2017-10-01 RX ORDER — ONDANSETRON 2 MG/ML
4 INJECTION INTRAMUSCULAR; INTRAVENOUS
Status: COMPLETED | OUTPATIENT
Start: 2017-10-01 | End: 2017-10-01

## 2017-10-01 RX ORDER — IBUPROFEN 200 MG
1 TABLET ORAL DAILY
Status: DISCONTINUED | OUTPATIENT
Start: 2017-10-01 | End: 2017-10-01

## 2017-10-01 RX ORDER — ONDANSETRON 2 MG/ML
4 INJECTION INTRAMUSCULAR; INTRAVENOUS
Status: DISCONTINUED | OUTPATIENT
Start: 2017-10-01 | End: 2017-10-03 | Stop reason: HOSPADM

## 2017-10-01 RX ORDER — TAMSULOSIN HYDROCHLORIDE 0.4 MG/1
0.4 CAPSULE ORAL DAILY
Status: DISCONTINUED | OUTPATIENT
Start: 2017-10-02 | End: 2017-10-03

## 2017-10-01 RX ORDER — GUAIFENESIN 100 MG/5ML
400 SOLUTION ORAL 3 TIMES DAILY
Status: DISCONTINUED | OUTPATIENT
Start: 2017-10-01 | End: 2017-10-03 | Stop reason: HOSPADM

## 2017-10-01 RX ORDER — SODIUM CHLORIDE 9 MG/ML
75 INJECTION, SOLUTION INTRAVENOUS CONTINUOUS
Status: DISCONTINUED | OUTPATIENT
Start: 2017-10-01 | End: 2017-10-03 | Stop reason: HOSPADM

## 2017-10-01 RX ORDER — HYDROMORPHONE HYDROCHLORIDE 2 MG/ML
0.5 INJECTION, SOLUTION INTRAMUSCULAR; INTRAVENOUS; SUBCUTANEOUS
Status: DISCONTINUED | OUTPATIENT
Start: 2017-10-01 | End: 2017-10-02

## 2017-10-01 RX ORDER — INSULIN LISPRO 100 [IU]/ML
INJECTION, SOLUTION INTRAVENOUS; SUBCUTANEOUS
Status: DISCONTINUED | OUTPATIENT
Start: 2017-10-01 | End: 2017-10-03 | Stop reason: HOSPADM

## 2017-10-01 RX ORDER — DEXTROSE 50 % IN WATER (D50W) INTRAVENOUS SYRINGE
12.5-25 AS NEEDED
Status: DISCONTINUED | OUTPATIENT
Start: 2017-10-01 | End: 2017-10-03 | Stop reason: HOSPADM

## 2017-10-01 RX ORDER — ENOXAPARIN SODIUM 100 MG/ML
40 INJECTION SUBCUTANEOUS EVERY 24 HOURS
Status: DISCONTINUED | OUTPATIENT
Start: 2017-10-01 | End: 2017-10-03 | Stop reason: HOSPADM

## 2017-10-01 RX ORDER — LEVOFLOXACIN 5 MG/ML
750 INJECTION, SOLUTION INTRAVENOUS EVERY 24 HOURS
Status: DISCONTINUED | OUTPATIENT
Start: 2017-10-02 | End: 2017-10-03 | Stop reason: HOSPADM

## 2017-10-01 RX ORDER — GUAIFENESIN 100 MG/5ML
81 LIQUID (ML) ORAL DAILY
Status: DISCONTINUED | OUTPATIENT
Start: 2017-10-01 | End: 2017-10-03 | Stop reason: HOSPADM

## 2017-10-01 RX ORDER — IBUPROFEN 200 MG
1 TABLET ORAL EVERY 24 HOURS
Status: DISCONTINUED | OUTPATIENT
Start: 2017-10-01 | End: 2017-10-03 | Stop reason: HOSPADM

## 2017-10-01 RX ORDER — ALPRAZOLAM 0.5 MG/1
2 TABLET ORAL 2 TIMES DAILY
Status: DISCONTINUED | OUTPATIENT
Start: 2017-10-01 | End: 2017-10-03 | Stop reason: HOSPADM

## 2017-10-01 RX ORDER — SODIUM CHLORIDE 0.9 % (FLUSH) 0.9 %
5-10 SYRINGE (ML) INJECTION AS NEEDED
Status: DISCONTINUED | OUTPATIENT
Start: 2017-10-01 | End: 2017-10-03 | Stop reason: HOSPADM

## 2017-10-01 RX ORDER — ZOLPIDEM TARTRATE 5 MG/1
10 TABLET ORAL
Status: DISCONTINUED | OUTPATIENT
Start: 2017-10-01 | End: 2017-10-03 | Stop reason: HOSPADM

## 2017-10-01 RX ADMIN — ONDANSETRON 4 MG: 2 INJECTION INTRAMUSCULAR; INTRAVENOUS at 14:08

## 2017-10-01 RX ADMIN — NOREPINEPHRINE BITARTRATE 8 MCG/MIN: 1 INJECTION, SOLUTION, CONCENTRATE INTRAVENOUS at 23:51

## 2017-10-01 RX ADMIN — SODIUM CHLORIDE 1000 ML: 900 INJECTION, SOLUTION INTRAVENOUS at 16:56

## 2017-10-01 RX ADMIN — ENOXAPARIN SODIUM 40 MG: 40 INJECTION SUBCUTANEOUS at 22:50

## 2017-10-01 RX ADMIN — ASPIRIN 81 MG 81 MG: 81 TABLET ORAL at 22:23

## 2017-10-01 RX ADMIN — NOREPINEPHRINE BITARTRATE 3 MCG/MIN: 1 INJECTION, SOLUTION, CONCENTRATE INTRAVENOUS at 23:33

## 2017-10-01 RX ADMIN — NOREPINEPHRINE BITARTRATE 2 MCG/MIN: 1 INJECTION, SOLUTION, CONCENTRATE INTRAVENOUS at 22:50

## 2017-10-01 RX ADMIN — SODIUM CHLORIDE 1000 ML: 900 INJECTION, SOLUTION INTRAVENOUS at 14:09

## 2017-10-01 RX ADMIN — PANTOPRAZOLE SODIUM 40 MG: 40 INJECTION, POWDER, FOR SOLUTION INTRAVENOUS at 14:08

## 2017-10-01 RX ADMIN — IPRATROPIUM BROMIDE AND ALBUTEROL SULFATE 3 ML: .5; 3 SOLUTION RESPIRATORY (INHALATION) at 22:28

## 2017-10-01 RX ADMIN — SODIUM CHLORIDE 1000 ML: 900 INJECTION, SOLUTION INTRAVENOUS at 18:56

## 2017-10-01 RX ADMIN — HYDROCODONE BITARTRATE AND ACETAMINOPHEN 1 TABLET: 7.5; 325 TABLET ORAL at 16:46

## 2017-10-01 RX ADMIN — SODIUM CHLORIDE 75 ML/HR: 900 INJECTION, SOLUTION INTRAVENOUS at 23:45

## 2017-10-01 RX ADMIN — LIDOCAINE HYDROCHLORIDE 40 ML: 20 SOLUTION ORAL; TOPICAL at 14:08

## 2017-10-01 RX ADMIN — MECLIZINE 25 MG: 12.5 TABLET ORAL at 18:56

## 2017-10-01 RX ADMIN — LEVOFLOXACIN 750 MG: 750 TABLET, FILM COATED ORAL at 16:46

## 2017-10-01 RX ADMIN — CEFEPIME HYDROCHLORIDE 2 G: 2 INJECTION, POWDER, FOR SOLUTION INTRAVENOUS at 22:30

## 2017-10-01 RX ADMIN — ALPRAZOLAM 2 MG: 0.5 TABLET ORAL at 22:23

## 2017-10-01 RX ADMIN — GUAIFENESIN 400 MG: 200 SOLUTION ORAL at 22:49

## 2017-10-01 RX ADMIN — ACETAMINOPHEN 650 MG: 325 TABLET ORAL at 22:49

## 2017-10-01 NOTE — IP AVS SNAPSHOT
[address] [facility] 
[phone] Patient: Tunde Jones MRN: [id number] RNO:1[birth year]/[id number] Current Discharge Medication List  
  
START taking these medications Dose & Instructions Dispensing Information Comments Morning Noon Evening Bedtime  
 levoFLOXacin 750 mg tablet Commonly known as:  Nadeem Echevarria Your last dose was: Your next dose is:    
   
   
 Dose:  750 mg Take 1 Tab by mouth daily for 7 days. Quantity:  7 Tab Refills:  0 CONTINUE these medications which have NOT CHANGED Dose & Instructions Dispensing Information Comments Morning Noon Evening Bedtime FISH OIL 1,000 mg Cap Generic drug:  omega-3 fatty acids-vitamin e Your last dose was: Your next dose is:    
   
   
 Dose:  1 Cap Take 1 Cap by mouth two (2) times a day. Refills:  0 Omeprazole delayed release 20 mg tablet Commonly known as:  PRILOSEC D/R Your last dose was: Your next dose is:    
   
   
 Dose:  20 mg Take 20 mg by mouth every morning. Refills:  0  
     
   
   
   
  
 ondansetron 4 mg disintegrating tablet Commonly known as:  ZOFRAN ODT Your last dose was: Your next dose is:    
   
   
 Dose:  4 mg Take 1 Tab by mouth every eight (8) hours as needed for Nausea. Quantity:  10 Tab Refills:  0  
     
   
   
   
  
 prasugrel 10 mg tablet Commonly known as:  EFFIENT Your last dose was: Your next dose is:    
   
   
 Dose:  10 mg Take 10 mg by mouth daily. Refills:  0  
     
   
   
   
  
 tamsulosin 0.4 mg capsule Commonly known as:  FLOMAX Your last dose was: Your next dose is:    
   
   
 Dose:  0.4 mg Take 0.4 mg by mouth daily. Refills:  0  
     
   
   
   
  
 VITAMIN D3 1,000 unit tablet Generic drug:  cholecalciferol Your last dose was: Your next dose is:    
   
   
 Dose:  1000 Units Take 1,000 Units by mouth daily. Refills:  0  
     
   
   
   
  
 XANAX 2 mg tablet Generic drug:  ALPRAZolam  
   
Your last dose was: Your next dose is:    
   
   
 Dose:  2 mg Take 2 mg by mouth two (2) times a day. Refills:  0  
     
   
   
   
  
 zolpidem 10 mg tablet Commonly known as:  AMBIEN Your last dose was: Your next dose is:    
   
   
 Dose:  10 mg Take 10 mg by mouth nightly as needed for Sleep. Refills:  0 STOP taking these medications   
 atorvastatin 80 mg tablet Commonly known as:  LIPITOR  
   
  
 lisinopril 10 mg tablet Commonly known as:  PRINIVIL, ZESTRIL  
   
  
 predniSONE 10 mg dose pack Commonly known as:  STERAPRED DS Where to Get Your Medications Information on where to get these meds will be given to you by the nurse or doctor. ! Ask your nurse or doctor about these medications  
  levoFLOXacin 750 mg tablet

## 2017-10-01 NOTE — ED NOTES
Patient is lying quietly on the stretcher in no apparent distress. Pt is alert and oriented x 4. Respirations are even and unlabored. No needs are expressed at this time.

## 2017-10-01 NOTE — ED NOTES
Bedside shift change report given to Shelly Reynolds RN (oncoming nurse) by Marlee Pinto RN (offgoing nurse). Report included the following information SBAR, ED Summary, MAR and Recent Results. Assumed care of patient who is lying quietly on the stretcher in no apparent distress. Pt is alert and oriented x 4. Respirations are even and unlabored. No needs are expressed at this time.

## 2017-10-01 NOTE — ED NOTES
Patient arrived from triage with complains of chest pain, epigastric pain and LUQ pain for 4-5 days with decreased intake.

## 2017-10-01 NOTE — IP AVS SNAPSHOT
[address] [facility] 
[phone] Patient: Benjamin Osorio MRN: [id number] BOM:[birth year] You are allergic to the following Allergen Reactions Latex Hives Adhesive Rash Codeine Hives Noberto Fisherman Morphine Itching NAUSEA Recent Documentation Height Weight BMI Smoking Status 1.753 m 79.8 kg 25.98 kg/m2 Current Every Day Smoker Unresulted Labs Order Current Status CULTURE, BLOOD, PAIRED Preliminary result Emergency Contacts Name Discharge Info Relation Home Work Mobile Rosa Casey  Other Relative [6] [phone] About your hospitalization You were admitted on:  October 1, 2017 You last received care in the:  [facility] You were discharged on:  October 3, 2017 Unit phone number:  [phone] Why you were hospitalized Your primary diagnosis was:  Not on File Your diagnoses also included:  Septic Shock (Hcc), Pneumonia, Chronic Pain, Liver Disease, Psychiatric Disorder, Htn (Hypertension) Providers Seen During Your Hospitalizations Provider Role Specialty Primary office phone Jero Chauhan DO Attending Provider Emergency Medicine [phone] Dejuan Pa MD Attending Provider Internal Medicine [phone] Your Primary Care Physician (PCP) Primary Care Physician Office Phone Office Fax Fron[facility] [phone] Follow-up Information Follow up With Details Comments Contact Info Laura Mendoza MD Go on 10/16/2017 PCP follow up at 3:00 PM  [address] [place] [id number] 
[phone] Danny Robles  Go on 10/25/2017 Follow up at 2:15 PM - For Hepatitis C treatment consideration. [facility] [address] [facility], [address] [place] [address] 
[phone] Current Discharge Medication List  
  
 START taking these medications Dose & Instructions Dispensing Information Comments Morning Noon Evening Bedtime  
 levoFLOXacin 750 mg tablet Commonly known as:  Chandra [place] Your last dose was: Your next dose is:    
   
   
 Dose:  750 mg Take 1 Tab by mouth daily for 7 days. Quantity:  7 Tab Refills:  0 CONTINUE these medications which have NOT CHANGED Dose & Instructions Dispensing Information Comments Morning Noon Evening Bedtime FISH OIL 1,000 mg Cap Generic drug:  omega-3 fatty acids-vitamin e Your last dose was: Your next dose is:    
   
   
 Dose:  1 Cap Take 1 Cap by mouth two (2) times a day. Refills:  0 Omeprazole delayed release 20 mg tablet Commonly known as:  PRILOSEC D/R Your last dose was: Your next dose is:    
   
   
 Dose:  20 mg Take 20 mg by mouth every morning. Refills:  0  
     
   
   
   
  
 ondansetron 4 mg disintegrating tablet Commonly known as:  ZOFRAN ODT Your last dose was: Your next dose is:    
   
   
 Dose:  4 mg Take 1 Tab by mouth every eight (8) hours as needed for Nausea. Quantity:  10 Tab Refills:  0  
     
   
   
   
  
 prasugrel 10 mg tablet Commonly known as:  EFFIENT Your last dose was: Your next dose is:    
   
   
 Dose:  10 mg Take 10 mg by mouth daily. Refills:  0  
     
   
   
   
  
 tamsulosin 0.4 mg capsule Commonly known as:  FLOMAX Your last dose was: Your next dose is:    
   
   
 Dose:  0.4 mg Take 0.4 mg by mouth daily. Refills:  0  
     
   
   
   
  
 VITAMIN D3 1,000 unit tablet Generic drug:  cholecalciferol Your last dose was: Your next dose is:    
   
   
 Dose:  1000 Units Take 1,000 Units by mouth daily. Refills:  0  
     
   
   
   
  
 XANAX 2 mg tablet Generic drug:  ALPRAZolam  
   
Your last dose was: Your next dose is:    
   
   
 Dose:  2 mg Take 2 mg by mouth two (2) times a day. Refills:  0  
     
   
   
   
  
 zolpidem 10 mg tablet Commonly known as:  AMBIEN Your last dose was: Your next dose is:    
   
   
 Dose:  10 mg Take 10 mg by mouth nightly as needed for Sleep. Refills:  0 STOP taking these medications   
 atorvastatin 80 mg tablet Commonly known as:  LIPITOR  
   
  
 lisinopril 10 mg tablet Commonly known as:  PRINIVIL, ZESTRIL  
   
  
 predniSONE 10 mg dose pack Commonly known as:  STERAPRED DS Where to Get Your Medications Information on where to get these meds will be given to you by the nurse or doctor. ! Ask your nurse or doctor about these medications  
  levoFLOXacin 750 mg tablet Discharge Instructions Diet: Heart Health Activity: As tolerated Cbc and Bmp in 1 week. Avoid smoking. Please check your blood pressure daily and take it to PCP. Please come back to ED if you develop diarrhea. Discharge Orders None Introducing [facility] SERVICES! [place] introduces [facility] [facility] patient portal. Now you can access parts of your medical record, email your doctor's office, and request medication refills online. 1. In your internet browser, go to [email or web address] 2. Click on the First Time User? Click Here link in the Sign In box. You will see the New Member Sign Up page. 3. Enter your [facility] Access Code exactly as it appears below. You will not need to use this code after youve completed the sign-up process. If you do not sign up before the expiration date, you must request a new code. · [facility] Access Code: [email or web address] Expires: 10/17/2017  5:59 PM 
 
4. Enter the last four digits of your Social Security Number (xxxx) and Date of Birth (mm/dd/yyyy) as indicated and click Submit. You will be taken to the next sign-up page. 5. Create a [employer] ID. This will be your [employer] login ID and cannot be changed, so think of one that is secure and easy to remember. 6. Create a [employer] password. You can change your password at any time. 7. Enter your Password Reset Question and Answer. This can be used at a later time if you forget your password. 8. Enter your e-mail address. You will receive e-mail notification when new information is available in [address] 9. Click Sign Up. You can now view and download portions of your medical record. 10. Click the Download Summary menu link to download a portable copy of your medical information. If you have questions, please visit the Frequently Asked Questions section of the [employer] website. Remember, [employer] is NOT to be used for urgent needs. For medical emergencies, dial 911. Now available from your iPhone and Android! General Information Please provide this summary of care documentation to your next provider. Patient Signature:  ____________________________________________________________ Date:  ____________________________________________________________  
  
Ynes Pichardo Provider Signature:  ____________________________________________________________ Date:  ____________________________________________________________

## 2017-10-01 NOTE — ED PROVIDER NOTES
[facility].  EMERGENCY DEPARTMENT HISTORY AND PHYSICAL EXAM       Date of Service: 10/1/2017   Patient Name: Eran Dhillon   YOB: 1955  Medical Record Number: [id number]    History of Presenting Illness     Chief Complaint   Patient presents with    Chest Pain     Pt. complains of chest pain, epigastric pain and LUQ pain for 4-5 days with decreased intake.  Abdominal Pain        History Provided By:  patient and spouse    Additional History:   Eran Dhillon is a 58 y.o. male, pmhx significant for HTN, GERD, hepatitis (in the 1970s), and depression/anxiety, who presents ambulatory with wife at [facility] to the ED c/o epigastric pain that radiates up towards his middle chest x 1 week. Pt notes associated dark tarry stools, nausea, diarrhea, mild SOB, decreased appetite, and feeling lightheaded. Per wife, pt also appears pale as well. Pt states that his pain is exacerbated with yawning or taking a deep breath. Pt states that he does have a hx of heavy drinking for a   couple of years, and states that he has not drank alcohol in 22 years. Pt is currently on anticoagulation, Effient, which pt repots compliance with. Pt denies hx of GI bleeds. Pt denies syncope, dizziness, vomiting, or hematemesis. PCP: Kristian Matias MD    PSHx: Significant for back surgery (lumbar spin x7)  Social Hx: + tobacco (1ppd), - EtOH (h/o heavy drinking, no alcohol use in 22 yrs), + Illicit Drugs (Marijuana)    There are no other complaints, changes, or physical findings at this time. Primary Care Provider: Kristian Matias MD   Specialist:   Cardiologist: Chencho Vasquez.  Ruben Dumont MD    Past History     Past Medical History:   Past Medical History:   Diagnosis Date    Arthritis     Chronic pain     GERD (gastroesophageal reflux disease)     Hypertension     Liver disease 1970'S    HX HEPATITIS    Nausea & vomiting     Psychiatric disorder     DEPRESSION AND ANXIETY        Past Surgical History:   Past Surgical History:   Procedure Laterality Date    ENDOSCOPY VISIT-OUTPATIENT  2003    HX BACK SURGERY      LUMBAR SPINE X 7        Family History:   Family History   Problem Relation Age of Onset    Cancer Father      BRAIN    Heart Disease Maternal Aunt     Stroke Maternal Aunt     Heart Disease Maternal Uncle     Stroke Maternal Uncle     Cancer Paternal Uncle      PROSTATE    Stroke Maternal Grandmother     Cancer Paternal Grandmother      BREAST    Heart Disease Sister     Heart Disease Sister         Social History:   Social History   Substance Use Topics    Smoking status: Current Every Day Smoker     Packs/day: 1.00     Years: 40.00    Smokeless tobacco: Never Used    Alcohol use No      Comment: RECOVERING ALCOHOLIC        Allergies: Allergies   Allergen Reactions    Latex Hives    Adhesive Rash    Codeine Hives     ITCHING, NAUSEA    Morphine Itching     NAUSEA         Review of Systems   Review of Systems   Constitutional: Positive for appetite change (decrease). Negative for chills, fatigue and fever. HENT: Negative. Negative for congestion, rhinorrhea, sinus pressure and sore throat. Eyes: Negative. Respiratory: Positive for shortness of breath. Negative for cough, choking, chest tightness and wheezing. Cardiovascular: Negative. Negative for chest pain, palpitations and leg swelling. Gastrointestinal: Positive for abdominal pain (epigastric), diarrhea and nausea. Negative for constipation and vomiting. Endocrine: Negative. Genitourinary: Negative. Negative for difficulty urinating, dysuria, flank pain and urgency. Musculoskeletal: Negative. Skin: Positive for pallor. Neurological: Positive for light-headedness. Negative for dizziness, syncope, speech difficulty, weakness, numbness and headaches. Psychiatric/Behavioral: Negative. All other systems reviewed and are negative.     Physical Exam  Physical Exam   Constitutional: He is oriented to person, place, and time. He appears well-developed and well-nourished. No distress. HENT:   Head: Normocephalic and atraumatic. Mouth/Throat: Oropharynx is clear and moist. No oropharyngeal exudate. Eyes: Conjunctivae and EOM are normal. Pupils are equal, round, and reactive to light. Neck: Normal range of motion. Neck supple. No JVD present. No tracheal deviation present. Cardiovascular: Normal rate, regular rhythm, normal heart sounds and intact distal pulses. No murmur heard. Pulmonary/Chest: Effort normal. No stridor. No respiratory distress. He has no wheezes. He has no rales. He exhibits no tenderness. + rhonchi   Abdominal: Soft. He exhibits no distension. There is tenderness (epigastric). There is no rebound and no guarding. Musculoskeletal: Normal range of motion. He exhibits no edema or tenderness. Neurological: He is alert and oriented to person, place, and time. No cranial nerve deficit. No gross motor or sensory deficits    Skin: Skin is warm and dry. He is not diaphoretic. There is pallor. Psychiatric: He has a normal mood and affect. His behavior is normal.   Nursing note and vitals reviewed. Medical Decision Making   I am the first provider for this patient. I reviewed the vital signs, available nursing notes, past medical history, past surgical history, family history and social history. Old Medical Records: Old medical records. Nursing notes.      Provider Notes:   DDx: upper GI bleed, acute blood loss anemia, pancreatitis    Critical Care Time:    9:09 PM    IMPENDING DETERIORATION -Cardiovascular    ASSOCIATED RISK FACTORS - Hypotension and Dehydration    MANAGEMENT- Bedside Assessment and Supervision of Care    INTERPRETATION -  Xrays, ECG, Blood Pressure and Cardiac Output Measures     INTERVENTIONS - hemodynamic mngmt    CASE REVIEW - Hospitalist, Nursing and Family    TREATMENT RESPONSE -Improved    PERFORMED BY - Self    NOTES   :    I have spent 45 minutes of critical care time involved in lab review, consultations with specialist, family decision- making, bedside attention and documentation. During this entire length of time I was immediately available to the patient . Ophelia Kirk DO    ED Course:  1:18 PM   Initial assessment performed. The patients presenting problems have been discussed, and they are in agreement with the care plan formulated and outlined with them. I have encouraged them to ask questions as they arise throughout their visit. Pt c/o epigastric pain and reports decrease appetite and intake. Pt states he has had several episodes of black stool. Pt has hx of alcohol dependence in the past but has not drank in years, pt does smoke, he has a cough here but pt states not abnormal for him. Pt denies any fever and does not report any hx consistent with infectious process, pt is  Hypotensive upon arrival but given reported hx likely due to acute blood loss anemia. Progress Notes:     Progress Note:  3:42 PM  Pt updated on his available imaging/lab results. Updated on plan of care. Written by Domitila Ohara ED Scribe, as dictated by Ophelia Kirk DO. Progress Note:  4:49 PM  Pt updated on results. Pt looked better, but when he stood up, he still complained of dizziness after walking less than 20 feet. Will give additional IV fluids. Written by Domitila Ohara ED Scribe, as dictated by Ophelia Kirk DO. Progress Note:  6:00 PM  Pt still consistently hypotensive despite IV hydration. Will consult with hospitalist, and place central line. Written by Domitila Ohara ED Scribe, as dictated by Ophelia Kirk DO.     HOSPITALIST CONSULT NOTE:   9:06 PM  Ophelia Kirk DO spoke with Manas Jauregui MD,   Specialty: Hospitalist  Discussed pt's hx, disposition, and available diagnostic and imaging results. Reviewed care plans. Consultant will evaluate pt for admission.   Written by Shanta Cain Raymond Barba, ED Scribe, as dictated by Joseph Watson DO. Procedures:     Procedure Note - Rectal Exam:   3:35 PM  Performed by: Joseph Watson DO  Chaperoned by: Cassidy Kramer MD  Rectal exam performed. Brown stool was collected. Stool was collected and sent to the lab for Hemoccult testing. The procedure took 1-15 minutes, and pt tolerated well. Procedure Note - Central Line Placement:   7:28 PM  Performed by: Joseph Watson DO    Immediately prior to the procedure, the patient was reevaluated and found suitable for the planned procedure and any planned medications. Immediately prior to the procedure a time out was called to verify the correct patient, procedure, equipment, staff, and marking as appropriate. Area was cleansed with Chlorprep and anesthetized with 3mLs of 1% lidocaine. Prepped and draped in sterile fashion. Landmarks identified. 20 gauge needle with triple lumen catheter was inserted into pt's Right, Internal Jugular Vein with ultrasound guidance. Line sutured in place; sterile dressing applied. Position: Trendelenburg  Number of attempts: 1  Estimated blood loss: 0cc  The procedure took 16-30 minutes, and pt tolerated well. Written by Yamileth Araya, ED Scribe, as dictated by Joseph Watson DO.      Sepsis Re-Assessment Documentation:     1930  Vital Signs  Level of Consciousness: Alert  Temp: 97.6 °F (36.4 °C)  Temp Source: Oral  Pulse (Heart Rate):73  Heart Rate Source: Monitor  Cardiac Rhythm: Normal sinus rhythm  Resp Rate: 26  BP: 97/55  Cap refill <2secs       Diagnostic Study Results     Labs -      Recent Results (from the past 12 hour(s))   EKG, 12 LEAD, INITIAL    Collection Time: 10/01/17  1:08 PM   Result Value Ref Range    Ventricular Rate 74 BPM    Atrial Rate 74 BPM    P-R Interval 132 ms    QRS Duration 86 ms    Q-T Interval 360 ms    QTC Calculation (Bezet) 399 ms    Calculated P Axis 55 degrees    Calculated R Axis 7 degrees    Calculated T Axis -9 degrees    Diagnosis       Normal sinus rhythm  Low voltage QRS  Inferior infarct , age undetermined  When compared with ECG of 18-JAN-2013 16:17,  Inferior infarct is now present  T wave inversion now evident in Inferior leads     PROTHROMBIN TIME + INR    Collection Time: 10/01/17  1:45 PM   Result Value Ref Range    INR 1.2 (H) 0.9 - 1.1      Prothrombin time 11.9 (H) 9.0 - 11.1 sec   PTT    Collection Time: 10/01/17  1:48 PM   Result Value Ref Range    aPTT 27.1 22.1 - 32.5 sec    aPTT, therapeutic range     58.0 - 77.0 SECS   TYPE & SCREEN    Collection Time: 10/01/17  1:48 PM   Result Value Ref Range    Crossmatch Expiration 10/04/2017     ABO/Rh(D) A POSITIVE     Antibody screen NEG    CBC WITH AUTOMATED DIFF    Collection Time: 10/01/17  1:48 PM   Result Value Ref Range    WBC 10.5 4.1 - 11.1 K/uL    RBC 4.36 4.10 - 5.70 M/uL    HGB 14.0 12.1 - 17.0 g/dL    HCT 39.2 36.6 - 50.3 %    MCV 89.9 80.0 - 99.0 FL    MCH 32.1 26.0 - 34.0 PG    MCHC 35.7 30.0 - 36.5 g/dL    RDW 14.3 11.5 - 14.5 %    PLATELET 899 709 - 471 K/uL    NEUTROPHILS 86 (H) 32 - 75 %    LYMPHOCYTES 7 (L) 12 - 49 %    MONOCYTES 7 5 - 13 %    EOSINOPHILS 0 0 - 7 %    BASOPHILS 0 0 - 1 %    ABS. NEUTROPHILS 9.1 (H) 1.8 - 8.0 K/UL    ABS. LYMPHOCYTES 0.7 (L) 0.8 - 3.5 K/UL    ABS. MONOCYTES 0.7 0.0 - 1.0 K/UL    ABS. EOSINOPHILS 0.0 0.0 - 0.4 K/UL    ABS.  BASOPHILS 0.0 0.0 - 0.1 K/UL    RBC COMMENTS NORMOCYTIC, NORMOCHROMIC      DF SMEAR SCANNED     METABOLIC PANEL, COMPREHENSIVE    Collection Time: 10/01/17  1:48 PM   Result Value Ref Range    Sodium 135 (L) 136 - 145 mmol/L    Potassium 4.0 3.5 - 5.1 mmol/L    Chloride 103 97 - 108 mmol/L    CO2 25 21 - 32 mmol/L    Anion gap 7 5 - 15 mmol/L    Glucose 139 (H) 65 - 100 mg/dL    BUN 13 6 - 20 MG/DL    Creatinine 0.96 0.70 - 1.30 MG/DL    BUN/Creatinine ratio 14 12 - 20      GFR est AA >60 >60 ml/min/1.73m2    GFR est non-AA >60 >60 ml/min/1.73m2    Calcium 8.6 8.5 - 10.1 MG/DL Bilirubin, total 0.9 0.2 - 1.0 MG/DL    ALT (SGPT) 74 12 - 78 U/L    AST (SGOT) 60 (H) 15 - 37 U/L    Alk. phosphatase 124 (H) 45 - 117 U/L    Protein, total 7.3 6.4 - 8.2 g/dL    Albumin 2.3 (L) 3.5 - 5.0 g/dL    Globulin 5.0 (H) 2.0 - 4.0 g/dL    A-G Ratio 0.5 (L) 1.1 - 2.2     LIPASE    Collection Time: 10/01/17  1:48 PM   Result Value Ref Range    Lipase 166 73 - 393 U/L   CK W/ CKMB & INDEX    Collection Time: 10/01/17  1:48 PM   Result Value Ref Range    CK 21 (L) 39 - 308 U/L    CK - MB <1.0 <3.6 NG/ML    CK-MB Index Cannot be calculated 0 - 2.5     MAGNESIUM    Collection Time: 10/01/17  1:48 PM   Result Value Ref Range    Magnesium 2.0 1.6 - 2.4 mg/dL   TROPONIN I    Collection Time: 10/01/17  1:48 PM   Result Value Ref Range    Troponin-I, Qt. <0.04 <0.05 ng/mL   FERRITIN    Collection Time: 10/01/17  1:48 PM   Result Value Ref Range    Ferritin 399 (H) 26 - 388 NG/ML   IRON PROFILE    Collection Time: 10/01/17  1:48 PM   Result Value Ref Range    Iron 28 (L) 35 - 150 ug/dL    TIBC 202 (L) 250 - 450 ug/dL    Iron % saturation 14 (L) 20 - 50 %   VITAMIN B12    Collection Time: 10/01/17  1:48 PM   Result Value Ref Range    Vitamin B12 612 211 - 911 pg/mL   FOLATE    Collection Time: 10/01/17  1:48 PM   Result Value Ref Range    Folate 10.4 5.0 - 21.0 ng/mL   OCCULT BLOOD, STOOL    Collection Time: 10/01/17  3:35 PM   Result Value Ref Range    Occult blood, stool NEGATIVE  NEG     LACTIC ACID    Collection Time: 10/01/17  7:08 PM   Result Value Ref Range    Lactic acid 1.0 0.4 - 2.0 MMOL/L       Radiologic Studies -  The following have been ordered and reviewed:  XR CHEST PA LAT   Final Result     EXAM:  XR CHEST PA LAT     INDICATION:   COUGH, CHEST PAIN     COMPARISON: None.     FINDINGS: PA and lateral radiographs of the chest demonstrate patchy airspace  infiltrate in the right middle lobe with partial obscuration of right heart  border. Lungs are otherwise clear.  The cardiac and mediastinal contours and  pulmonary vascularity are normal.  Chest wall structures and visualized upper  abdomen show degenerative spine and shoulder changes and posterior gareth and screw  fixation hardware traversing lower thoracic spine to below the field of view.      IMPRESSION  IMPRESSION: Right middle lobe patchy infiltrate suspicious for pneumonia in the  appropriate clinical setting. Vital Signs-Reviewed the patient's vital signs.    Patient Vitals for the past 12 hrs:   Temp Pulse Resp BP SpO2   10/01/17 2030 - 69 27 96/54 95 %   10/01/17 1830 - 69 16 92/51 95 %   10/01/17 1800 - 70 19 92/44 95 %   10/01/17 1730 - 68 14 99/56 97 %   10/01/17 1718 - 69 15 103/64 98 %   10/01/17 1700 - 68 22 110/60 100 %   10/01/17 1645 - 74 20 122/71 97 %   10/01/17 1636 - 70 12 113/72 100 %   10/01/17 1600 - 69 25 110/57 99 %   10/01/17 1545 - 71 20 110/68 98 %   10/01/17 1530 - 69 26 110/62 98 %   10/01/17 1515 - 74 19 107/63 97 %   10/01/17 1500 - 70 12 104/63 100 %   10/01/17 1445 - 63 24 103/58 99 %   10/01/17 1430 - 72 15 102/63 97 %   10/01/17 1422 - - - 104/56 -   10/01/17 1345 - 69 24 97/55 98 %   10/01/17 1316 - 73 19 - 97 %   10/01/17 1315 - 78 21 98/57 -   10/01/17 1305 98 °F (36.7 °C) 77 16 (!) 84/50 97 %       Medications Given in the ED:  Medications   sodium chloride (NS) flush 5-10 mL (not administered)   sodium chloride (NS) flush 5-10 mL (not administered)   sodium chloride 0.9 % bolus infusion 250 mL (not administered)   NOREPINephrine (LEVOPHED) 8,000 mcg in dextrose 5% 250 mL infusion (not administered)   cefepime (MAXIPIME) 2 g in 0.9% sodium chloride (MBP/ADV) 100 mL (not administered)   ALPRAZolam (XANAX) tablet 2 mg (not administered)   pantoprazole (PROTONIX) tablet 40 mg (not administered)   prasugrel (EFFIENT) tablet 10 mg (not administered)   tamsulosin (FLOMAX) capsule 0.4 mg (not administered)   zolpidem (AMBIEN) tablet 10 mg (not administered)   0.9% sodium chloride infusion (not administered)   cefepime (MAXIPIME) 2 g in 0.9% sodium chloride (MBP/ADV) 100 mL (not administered)   levoFLOXacin (LEVAQUIN) 750 mg in D5W IVPB (not administered)   albuterol-ipratropium (DUO-NEB) 2.5 MG-0.5 MG/3 ML (not administered)   guaiFENesin (ROBITUSSIN) 100 mg/5 mL oral liquid 400 mg (not administered)   acetaminophen (TYLENOL) tablet 650 mg (not administered)   ondansetron (ZOFRAN) injection 4 mg (not administered)   enoxaparin (LOVENOX) injection 40 mg (not administered)   nicotine (NICODERM CQ) 14 mg/24 hr patch 1 Patch (not administered)   insulin lispro (HUMALOG) injection (not administered)   glucose chewable tablet 16 g (not administered)   dextrose (D50W) injection syrg 12.5-25 g (not administered)   glucagon (GLUCAGEN) injection 1 mg (not administered)   HYDROmorphone (DILAUDID) injection 0.5 mg (not administered)   aspirin chewable tablet 81 mg (not administered)   sodium chloride 0.9 % bolus infusion 1,000 mL (1,000 mL IntraVENous New Bag 10/1/17 1409)   ondansetron (ZOFRAN) injection 4 mg (4 mg IntraVENous Given 10/1/17 1408)   mylanta/viscous lidocaine (EMILY)(GI COCKTAIL) (40 mL Oral Given 10/1/17 1408)   pantoprazole (PROTONIX) injection 40 mg (40 mg IntraVENous Given 10/1/17 1408)   levoFLOXacin (LEVAQUIN) tablet 750 mg (750 mg Oral Given 10/1/17 1646)   HYDROcodone-acetaminophen (NORCO) 7.5-325 mg per tablet 1 Tab (1 Tab Oral Given 10/1/17 1646)   sodium chloride 0.9 % bolus infusion 1,000 mL (0 mL IntraVENous IV Completed 10/1/17 1940)   meclizine (ANTIVERT) tablet 25 mg (25 mg Oral Given 10/1/17 1856)   sodium chloride 0.9 % bolus infusion 1,000 mL (0 mL IntraVENous IV Completed 10/1/17 1956)       EKG interpretation: (Preliminary)  Rhythm: normal sinus rhythm; and regular . Rate (approx.): 74; Axis: normal; KS interval: normal; QRS interval: normal ; ST/T wave: non-specific changes; q waves inferior. Written by LOBITO Heibjorden, as dictated by Mary Ann Caldwell DO      Diagnosis   Clinical Impression: 1. Septic shock ([facility] [address]    2. Pneumonia due to infectious organism, unspecified laterality, unspecified part of lung         Plan:  1: Admit to hospitalist    Disposition Note:  Admission Note:  8:58 PM  Patient is being admitted to the hospital by Dr. Jonathan Moss. The results of their tests and reasons for their admission have been discussed with them and/or available family. They convey agreement and understanding for the need to be admitted and for their admission diagnosis. Written by Gopi Solorzano ED Scribjorden, as dictated by Geovanny Locke DO.    _______________________________     Attestation: This note is prepared by Gopi Solorzano, acting as Scribe for Samson Mahmood DO: The scribe's documentation has been prepared under my direction and personally reviewed by me in its entirety.  I confirm that the note above accurately reflects all work, treatment, procedures, and medical decision making performed by me.  _______________________________

## 2017-10-01 NOTE — ED NOTES
Dr Lucien Huertas wants pt to get 250 mL of 1000 mL bolus at 999 mL/hr, and then cut the rate back to 100 mL/hr.   Making pt a healthy choice meal

## 2017-10-01 NOTE — ED NOTES
Bedside shift change report given to Qasim Samano (oncoming nurse) by Rena Briscoe (offgoing nurse). Report included the following information SBAR, Kardex, ED Summary and MAR.

## 2017-10-02 LAB
ALBUMIN SERPL-MCNC: 1.9 G/DL (ref 3.5–5)
ALBUMIN/GLOB SERPL: 0.5 {RATIO} (ref 1.1–2.2)
ALP SERPL-CCNC: 103 U/L (ref 45–117)
ALT SERPL-CCNC: 62 U/L (ref 12–78)
ANION GAP SERPL CALC-SCNC: 5 MMOL/L (ref 5–15)
AST SERPL-CCNC: 48 U/L (ref 15–37)
BASOPHILS # BLD: 0 K/UL (ref 0–0.1)
BASOPHILS NFR BLD: 0 % (ref 0–1)
BILIRUB SERPL-MCNC: 0.7 MG/DL (ref 0.2–1)
BUN SERPL-MCNC: 8 MG/DL (ref 6–20)
BUN/CREAT SERPL: 12 (ref 12–20)
CALCIUM SERPL-MCNC: 7.7 MG/DL (ref 8.5–10.1)
CHLORIDE SERPL-SCNC: 110 MMOL/L (ref 97–108)
CO2 SERPL-SCNC: 23 MMOL/L (ref 21–32)
CREAT SERPL-MCNC: 0.69 MG/DL (ref 0.7–1.3)
EOSINOPHIL # BLD: 0 K/UL (ref 0–0.4)
EOSINOPHIL NFR BLD: 0 % (ref 0–7)
ERYTHROCYTE [DISTWIDTH] IN BLOOD BY AUTOMATED COUNT: 14.1 % (ref 11.5–14.5)
EST. AVERAGE GLUCOSE BLD GHB EST-MCNC: NORMAL MG/DL
GLOBULIN SER CALC-MCNC: 4.2 G/DL (ref 2–4)
GLUCOSE BLD STRIP.AUTO-MCNC: 125 MG/DL (ref 65–100)
GLUCOSE BLD STRIP.AUTO-MCNC: 199 MG/DL (ref 65–100)
GLUCOSE BLD STRIP.AUTO-MCNC: 227 MG/DL (ref 65–100)
GLUCOSE BLD STRIP.AUTO-MCNC: 97 MG/DL (ref 65–100)
GLUCOSE SERPL-MCNC: 224 MG/DL (ref 65–100)
HAV IGM SER QL: NONREACTIVE
HBA1C MFR BLD: 4.7 % (ref 4.2–6.3)
HBV CORE IGM SER QL: NONREACTIVE
HBV SURFACE AG SER QL: <0.1 INDEX
HBV SURFACE AG SER QL: NEGATIVE
HCT VFR BLD AUTO: 34.1 % (ref 36.6–50.3)
HCV AB SER IA-ACNC: >11.9 INDEX
HCV AB SERPL QL IA: REACTIVE
HCV COMMENT,HCGAC: ABNORMAL
HGB BLD-MCNC: 12 G/DL (ref 12.1–17)
LYMPHOCYTES # BLD: 0.8 K/UL (ref 0.8–3.5)
LYMPHOCYTES NFR BLD: 12 % (ref 12–49)
MAGNESIUM SERPL-MCNC: 2 MG/DL (ref 1.6–2.4)
MCH RBC QN AUTO: 31.8 PG (ref 26–34)
MCHC RBC AUTO-ENTMCNC: 35.2 G/DL (ref 30–36.5)
MCV RBC AUTO: 90.5 FL (ref 80–99)
MONOCYTES # BLD: 0.6 K/UL (ref 0–1)
MONOCYTES NFR BLD: 9 % (ref 5–13)
NEUTS SEG # BLD: 5.4 K/UL (ref 1.8–8)
NEUTS SEG NFR BLD: 79 % (ref 32–75)
PLATELET # BLD AUTO: 146 K/UL (ref 150–400)
POTASSIUM SERPL-SCNC: 3.5 MMOL/L (ref 3.5–5.1)
PROT SERPL-MCNC: 6.1 G/DL (ref 6.4–8.2)
RBC # BLD AUTO: 3.77 M/UL (ref 4.1–5.7)
SERVICE CMNT-IMP: ABNORMAL
SERVICE CMNT-IMP: NORMAL
SODIUM SERPL-SCNC: 138 MMOL/L (ref 136–145)
SP1: ABNORMAL
SP2: ABNORMAL
SP3: ABNORMAL
TROPONIN I SERPL-MCNC: <0.04 NG/ML
TSH SERPL DL<=0.05 MIU/L-ACNC: 1.63 UIU/ML (ref 0.36–3.74)
WBC # BLD AUTO: 6.9 K/UL (ref 4.1–11.1)

## 2017-10-02 PROCEDURE — G8987 SELF CARE CURRENT STATUS: HCPCS | Performed by: OCCUPATIONAL THERAPIST

## 2017-10-02 PROCEDURE — 74011636637 HC RX REV CODE- 636/637: Performed by: INTERNAL MEDICINE

## 2017-10-02 PROCEDURE — 85025 COMPLETE CBC W/AUTO DIFF WBC: CPT | Performed by: INTERNAL MEDICINE

## 2017-10-02 PROCEDURE — 84484 ASSAY OF TROPONIN QUANT: CPT | Performed by: INTERNAL MEDICINE

## 2017-10-02 PROCEDURE — 77030029684 HC NEB SM VOL KT MONA -A

## 2017-10-02 PROCEDURE — 94664 DEMO&/EVAL PT USE INHALER: CPT

## 2017-10-02 PROCEDURE — 97535 SELF CARE MNGMENT TRAINING: CPT | Performed by: OCCUPATIONAL THERAPIST

## 2017-10-02 PROCEDURE — G8988 SELF CARE GOAL STATUS: HCPCS | Performed by: OCCUPATIONAL THERAPIST

## 2017-10-02 PROCEDURE — 80053 COMPREHEN METABOLIC PANEL: CPT | Performed by: INTERNAL MEDICINE

## 2017-10-02 PROCEDURE — 36415 COLL VENOUS BLD VENIPUNCTURE: CPT | Performed by: INTERNAL MEDICINE

## 2017-10-02 PROCEDURE — 84443 ASSAY THYROID STIM HORMONE: CPT | Performed by: INTERNAL MEDICINE

## 2017-10-02 PROCEDURE — 83036 HEMOGLOBIN GLYCOSYLATED A1C: CPT | Performed by: INTERNAL MEDICINE

## 2017-10-02 PROCEDURE — 74011000258 HC RX REV CODE- 258: Performed by: INTERNAL MEDICINE

## 2017-10-02 PROCEDURE — 65660000000 HC RM CCU STEPDOWN

## 2017-10-02 PROCEDURE — 74011250636 HC RX REV CODE- 250/636: Performed by: INTERNAL MEDICINE

## 2017-10-02 PROCEDURE — 82962 GLUCOSE BLOOD TEST: CPT

## 2017-10-02 PROCEDURE — 74011250637 HC RX REV CODE- 250/637: Performed by: INTERNAL MEDICINE

## 2017-10-02 PROCEDURE — 93306 TTE W/DOPPLER COMPLETE: CPT

## 2017-10-02 PROCEDURE — 97161 PT EVAL LOW COMPLEX 20 MIN: CPT

## 2017-10-02 PROCEDURE — 94640 AIRWAY INHALATION TREATMENT: CPT

## 2017-10-02 PROCEDURE — 74011000250 HC RX REV CODE- 250: Performed by: INTERNAL MEDICINE

## 2017-10-02 PROCEDURE — 97165 OT EVAL LOW COMPLEX 30 MIN: CPT | Performed by: OCCUPATIONAL THERAPIST

## 2017-10-02 PROCEDURE — 83735 ASSAY OF MAGNESIUM: CPT | Performed by: INTERNAL MEDICINE

## 2017-10-02 PROCEDURE — 97110 THERAPEUTIC EXERCISES: CPT

## 2017-10-02 RX ORDER — HYDROMORPHONE HYDROCHLORIDE 2 MG/ML
0.5 INJECTION, SOLUTION INTRAMUSCULAR; INTRAVENOUS; SUBCUTANEOUS
Status: DISCONTINUED | OUTPATIENT
Start: 2017-10-02 | End: 2017-10-03

## 2017-10-02 RX ADMIN — IPRATROPIUM BROMIDE AND ALBUTEROL SULFATE 3 ML: .5; 3 SOLUTION RESPIRATORY (INHALATION) at 21:27

## 2017-10-02 RX ADMIN — IPRATROPIUM BROMIDE AND ALBUTEROL SULFATE 3 ML: .5; 3 SOLUTION RESPIRATORY (INHALATION) at 07:57

## 2017-10-02 RX ADMIN — Medication 10 ML: at 22:32

## 2017-10-02 RX ADMIN — IPRATROPIUM BROMIDE AND ALBUTEROL SULFATE 3 ML: .5; 3 SOLUTION RESPIRATORY (INHALATION) at 14:06

## 2017-10-02 RX ADMIN — GUAIFENESIN 400 MG: 200 SOLUTION ORAL at 22:25

## 2017-10-02 RX ADMIN — LEVOFLOXACIN 750 MG: 5 INJECTION, SOLUTION INTRAVENOUS at 17:01

## 2017-10-02 RX ADMIN — ENOXAPARIN SODIUM 40 MG: 40 INJECTION SUBCUTANEOUS at 22:25

## 2017-10-02 RX ADMIN — CEFEPIME HYDROCHLORIDE 2 G: 2 INJECTION, POWDER, FOR SOLUTION INTRAVENOUS at 06:16

## 2017-10-02 RX ADMIN — ACETAMINOPHEN 650 MG: 325 TABLET ORAL at 11:24

## 2017-10-02 RX ADMIN — SODIUM CHLORIDE 75 ML/HR: 900 INJECTION, SOLUTION INTRAVENOUS at 13:48

## 2017-10-02 RX ADMIN — IPRATROPIUM BROMIDE AND ALBUTEROL SULFATE 3 ML: .5; 3 SOLUTION RESPIRATORY (INHALATION) at 04:20

## 2017-10-02 RX ADMIN — ALPRAZOLAM 2 MG: 0.5 TABLET ORAL at 22:25

## 2017-10-02 RX ADMIN — ZOLPIDEM TARTRATE 10 MG: 5 TABLET ORAL at 00:52

## 2017-10-02 RX ADMIN — Medication 10 ML: at 06:17

## 2017-10-02 RX ADMIN — TAMSULOSIN HYDROCHLORIDE 0.4 MG: 0.4 CAPSULE ORAL at 09:14

## 2017-10-02 RX ADMIN — ALPRAZOLAM 2 MG: 0.5 TABLET ORAL at 09:13

## 2017-10-02 RX ADMIN — PANTOPRAZOLE SODIUM 40 MG: 40 TABLET, DELAYED RELEASE ORAL at 09:15

## 2017-10-02 RX ADMIN — GUAIFENESIN 400 MG: 200 SOLUTION ORAL at 17:01

## 2017-10-02 RX ADMIN — PRASUGREL HYDROCHLORIDE 10 MG: 10 TABLET, FILM COATED ORAL at 09:00

## 2017-10-02 RX ADMIN — GUAIFENESIN 400 MG: 200 SOLUTION ORAL at 09:14

## 2017-10-02 RX ADMIN — Medication 10 ML: at 13:57

## 2017-10-02 RX ADMIN — CEFEPIME HYDROCHLORIDE 2 G: 2 INJECTION, POWDER, FOR SOLUTION INTRAVENOUS at 13:50

## 2017-10-02 RX ADMIN — CEFEPIME HYDROCHLORIDE 2 G: 2 INJECTION, POWDER, FOR SOLUTION INTRAVENOUS at 22:25

## 2017-10-02 RX ADMIN — INSULIN LISPRO 2 UNITS: 100 INJECTION, SOLUTION INTRAVENOUS; SUBCUTANEOUS at 09:15

## 2017-10-02 NOTE — PROGRESS NOTES
Physical Therapy  10/2/2017    1049: Chart reviewed and orders acknowledged. RN requesting to hold therapy this morning due to pressures still being down. Pt currently being medicated to control BP. Will follow up this afternoon when appropriate for evaluation.     Thank Lisa Cloud, PT, DPT

## 2017-10-02 NOTE — PROGRESS NOTES
Primary Nurse Ankush Martin and Briana Camargo RN performed a dual skin assessment on this patient clean dry with no breakdown noted.   Juma Scale 20

## 2017-10-02 NOTE — PROGRESS NOTES
Pharmacy Automatic Renal Dosing Protocol - Antimicrobials    Indication for Antimicrobials: CAP, septic shock  Current Regimen of Each Antimicrobial (Start Day & Day of Therapy):  Cefepime 2 grams IV q 8 hr-10/1-day #1  Levaquin 750 mg IV q 24 hr-10/1-day #1  Significant cultures: 10/1-blood culture-pending    CAPD, Intermittent Hemodialysis or Renal Replacement Therapy: no  Paralysis, amputations, malnutrition: no  Recent Labs      10/01/17   1348   CREA  0.96   BUN  13   WBC  10.5     Temp (24hrs), Av °F (36.7 °C), Min:98 °F (36.7 °C), Max:98 °F (36.7 °C)    Creatinine Clearance (ml/min): 77        Impression/Plan: Current doses appropriate for renal function        Pharmacy will follow daily and adjust doses of monitored medications as appropriate for renal function and/or serum levels.     Thank you,  Rashaad Guillen, PHARMD

## 2017-10-02 NOTE — PROGRESS NOTES
Hospitalist Progress Note    NAME: Renzo Mcnelil   :  1955   MRN:  [id number]       Assessment / Plan:  Septic Shock. RLL Pneumonia  Continue Cefepime and LVQ, bronchodilators, mucolytics, check sputum. Off levophed. C/w iv fluids. F/u cultures results they are NTD. HTN: BP is low, hold ACE for now but will resume soon once Blood pressure gets better. Mild elevation of AST  hold Statin, Hepatitis panel positive for Hep  C so will need out pt f/u for rx consideration   Chest pain: seems to be pleuritic, troponin is negative, check ECHO. GERD: c/w PPI. Chronic Pain: had a dilaudid pump at some point, c/w pain meds for now. Psychiatric Disorder: seems stable, monitor. Few episodes of dark stool at home: No further episodes here. Hb is stable. Code Status: Full Code  Surrogate Decision Maker: sister Sheila Powell [phone]  DVT Prophylaxis: Lovenox  GI Prophylaxis: on PPI  Baseline: fairly independent full ADL      Body mass index is 26.01 kg/(m^2). Recommended Disposition: Home w/Family     Subjective: Sob is better. Off leveophed. Mild cough. No chest pain. No fever. Chief Complaint / Reason for Physician Visit  Sob    Review of Systems:  Symptom Y/N Comments  Symptom Y/N Comments   Fever/Chills n   Chest Pain n    Poor Appetite n   Edema     Cough y   Abdominal Pain n    Sputum y   Joint Pain     SOB/KELLEY y   Pruritis/Rash     Nausea/vomit n   Tolerating PT/OT     Diarrhea    Tolerating Diet     Constipation    Other       Could NOT obtain due to:      Objective:     VITALS:   Last 24hrs VS reviewed since prior progress note.  Most recent are:  Patient Vitals for the past 24 hrs:   Temp Pulse Resp BP SpO2   10/02/17 1445 - - - 104/62 -   10/02/17 1430 - - - 93/65 -   10/02/17 1415 - - - 93/57 -   10/02/17 1406 - - - - 99 %   10/02/17 1405 - - - 95/58 -   10/02/17 1401 - - - 98/65 -   10/02/17 1300 - - - 116/73 -   10/02/17 1255 - - - 101/70 -   10/02/17 1237 - - - 107/66 -   10/02/17 121 - - - 112/76 -   10/02/17 1200 - - - 100/57 -   10/02/17 1145 - - - 111/67 -   10/02/17 1130 - - - 103/54 -   10/02/17 1115 98.4 °F (36.9 °C) 71 24 112/66 96 %   10/02/17 1101 - - - 108/78 -   10/02/17 1046 - - - 119/66 -   10/02/17 1030 - - - 114/64 -   10/02/17 1015 - - - 104/63 -   10/02/17 1001 - - - 99/54 -   10/02/17 0945 - - - 93/55 -   10/02/17 0930 - - - 97/56 -   10/02/17 0915 - - - 103/58 -   10/02/17 0909 - 93 - 98/57 -   10/02/17 0845 - 91 - 94/59 -   10/02/17 0830 - 96 - 102/59 -   10/02/17 0815 - 95 - 94/61 -   10/02/17 0810 - 88 16 91/61 100 %   10/02/17 0758 - - - - 97 %   10/02/17 0743 - - - 120/72 -   10/02/17 0736 97.6 °F (36.4 °C) 73 22 122/73 100 %   10/02/17 0630 - - - 100/63 -   10/02/17 0600 - - - 102/58 -   10/02/17 0530 - - - 101/58 -   10/02/17 0500 - - - 105/59 -   10/02/17 0430 - - - (!) 135/96 -   10/02/17 0419 - - - - 99 %   10/02/17 0400 - - - 124/78 -   10/02/17 0330 97.1 °F (36.2 °C) 68 16 105/79 100 %   10/02/17 0300 - - - 110/69 -   10/02/17 0245 - - - 121/80 -   10/02/17 0230 - - - 124/75 -   10/02/17 0200 - - - 126/75 -   10/02/17 0130 - - - 127/71 -   10/02/17 0000 98.7 °F (37.1 °C) 96 18 124/71 100 %   10/01/17 2359 - (!) 102 21 110/65 96 %   10/01/17 2345 - 94 15 91/53 93 %   10/01/17 2330 - 99 23 100/54 94 %   10/01/17 2300 - 92 24 105/52 94 %   10/01/17 2230 - 69 26 95/51 97 %   10/01/17 2200 - 76 18 - 97 %   10/01/17 2130 - 73 26 97/55 95 %   10/01/17 2100 - 74 25 99/55 97 %   10/01/17 2030 - 69 27 96/54 95 %   10/01/17 1930 - 69 18 102/57 97 %   10/01/17 1900 - 71 19 (!) 82/64 97 %   10/01/17 1830 - 69 16 92/51 95 %   10/01/17 1800 - 70 19 92/44 95 %   10/01/17 1730 - 68 14 99/56 97 %   10/01/17 1718 - 69 15 103/64 98 %   10/01/17 1700 - 68 22 110/60 100 %   10/01/17 1645 - 74 20 122/71 97 %   10/01/17 1636 - 70 12 113/72 100 %       Intake/Output Summary (Last 24 hours) at 10/02/17 1630  Last data filed at 10/02/17 1117   Gross per 24 hour   Intake          1077.47 ml Output             2300 ml   Net         -1222.53 ml        PHYSICAL EXAM:  General: cooperative, no acute distress    EENT:  EOMI. Anicteric sclerae. MMM  Resp:  B/l air entry +, crackles +, no wheezing  CV:  Regular  rhythm,  No edema  GI:  Soft, Non distended, Non tender.  +Bowel sounds  Neurologic:  Alert and oriented X 3, normal speech,   Psych:   Good insight. Not anxious nor agitated  Skin:  No rashes.   No jaundice    Reviewed most current lab test results and cultures  YES  Reviewed most current radiology test results   YES  Review and summation of old records today    NO  Reviewed patient's current orders and MAR    YES  PMH/SH reviewed - no change compared to H&P          Current Facility-Administered Medications:     HYDROmorphone (PF) (DILAUDID) injection 0.5 mg, 0.5 mg, IntraVENous, Q6H PRN, Marnie Harp MD    sodium chloride (NS) flush 5-10 mL, 5-10 mL, IntraVENous, Q8H, Jay Edward DO, 10 mL at 10/02/17 1357    sodium chloride (NS) flush 5-10 mL, 5-10 mL, IntraVENous, PRN, Leny Ohara DO    NOREPINephrine (LEVOPHED) 8,000 mcg in dextrose 5% 250 mL infusion, 2-30 mcg/min, IntraVENous, TITRATE, Leny Ohara DO, Stopped at 10/02/17 1357    ALPRAZolam (XANAX) tablet 2 mg, 2 mg, Oral, BID, Issac Nolasco MD, 2 mg at 10/02/17 0913    pantoprazole (PROTONIX) tablet 40 mg, 40 mg, Oral, ACB, Issac Nolasco MD, 40 mg at 10/02/17 0915    prasugrel (EFFIENT) tablet 10 mg, 10 mg, Oral, DAILY, Issac Nolasco MD, 10 mg at 10/02/17 0900    tamsulosin (FLOMAX) capsule 0.4 mg, 0.4 mg, Oral, DAILY, Issac Nolasco MD, 0.4 mg at 10/02/17 0914    zolpidem (AMBIEN) tablet 10 mg, 10 mg, Oral, QHS PRN, Issac Nolasco MD, 10 mg at 10/02/17 0052    0.9% sodium chloride infusion, 75 mL/hr, IntraVENous, CONTINUOUS, Issac Nolasco MD, Last Rate: 75 mL/hr at 10/02/17 1348, 75 mL/hr at 10/02/17 1348    cefepime (MAXIPIME) 2 g in 0.9% sodium chloride (MBP/ADV) 100 mL, 2 g, IntraVENous, Q8H, Ole Junior Dina Wiggins MD, Last Rate: 33.3 mL/hr at 10/02/17 1350, 2 g at 10/02/17 1350    levoFLOXacin (LEVAQUIN) 750 mg in D5W IVPB, 750 mg, IntraVENous, Q24H, Natalie Suarez MD    albuterol-ipratropium (DUO-NEB) 2.5 MG-0.5 MG/3 ML, 3 mL, Nebulization, Q6H RT, Natalie Suarez MD, 3 mL at 10/02/17 1406    guaiFENesin (ROBITUSSIN) 100 mg/5 mL oral liquid 400 mg, 400 mg, Oral, TID, Natalie Suarez MD, 400 mg at 10/02/17 0914    acetaminophen (TYLENOL) tablet 650 mg, 650 mg, Oral, Q4H PRN, Natalie Suarez MD, 650 mg at 10/02/17 1124    ondansetron (ZOFRAN) injection 4 mg, 4 mg, IntraVENous, Q6H PRN, Natalie Suarez MD    insulin lispro (HUMALOG) injection, , SubCUTAneous, AC&HS, Natalie Suarez MD, Stopped at 10/02/17 1130    glucose chewable tablet 16 g, 4 Tab, Oral, PRN, Natalie Suarez MD    dextrose (D50W) injection syrg 12.5-25 g, 12.5-25 g, IntraVENous, PRN, Natalie Suarez MD    glucagon (GLUCAGEN) injection 1 mg, 1 mg, IntraMUSCular, PRN, Natalie Suarez MD    aspirin chewable tablet 81 mg, 81 mg, Oral, DAILY, Natalie Suarez MD, 81 mg at 10/01/17 2223    enoxaparin (LOVENOX) injection 40 mg, 40 mg, SubCUTAneous, Q24H, Natalie Suarez MD, 40 mg at 10/01/17 2250    nicotine (NICODERM CQ) 14 mg/24 hr patch 1 Patch, 1 Patch, TransDERmal, Q24H, Natalie Suarez MD, 1 Patch at 10/01/17 2224  ________________________________________________________________________  Care Plan discussed with:    Comments   Patient y    Family      RN y    Care Manager     Consultant                        Multidiciplinary team rounds were held today with , nursing, pharmacist and clinical coordinator. Patient's plan of care was discussed; medications were reviewed and discharge planning was addressed.      ________________________________________________________________________  Total NON critical care TIME:  35 Minutes    Total CRITICAL CARE TIME Spent:   Minutes non procedure based      Comments >50% of visit spent in counseling and coordination of care     ________________________________________________________________________  Megan Cavazos MD     Procedures: see electronic medical records for all procedures/Xrays and details which were not copied into this note but were reviewed prior to creation of Plan. LABS:  I reviewed today's most current labs and imaging studies.   Pertinent labs include:  Recent Labs      10/02/17   0426  10/01/17   1348   WBC  6.9  10.5   HGB  12.0*  14.0   HCT  34.1*  39.2   PLT  146*  169     Recent Labs      10/02/17   0426  10/01/17   1348  10/01/17   1345   NA  138  135*   --    K  3.5  4.0   --    CL  110*  103   --    CO2  23  25   --    GLU  224*  139*   --    BUN  8  13   --    CREA  0.69*  0.96   --    CA  7.7*  8.6   --    MG  2.0  2.0   --    ALB  1.9*  2.3*   --    TBILI  0.7  0.9   --    SGOT  48*  60*   --    ALT  62  74   --    INR   --    --   1.2*       Signed: Megan Cavazos MD

## 2017-10-02 NOTE — H&P
Hospitalist Admission Note    NAME: Liza Tipton   :  1955   MRN:  [id number]     Date/Time:  10/1/2017 8:29 PM    Patient PCP: Amari Barnett MD  ________________________________________________________________________    My assessment of this patient's clinical condition and my plan of care is as follows. Assessment / Plan:  Septic Shock?: hypotension in a patient with PNA, had received 3LNS BP is still marginal, will admit to ICU and start pressors, c/w IVF. RLL Pneumonia: start Cefepime and LVQ, bronchodilators, mucolytics, check sputum. HTN: BP is low, hold ACE, now on pressors, monitor. Liver Disease: has increased LFT, hold Statin, check Hepatitis panel. Monitor. Chest pain: seems to be pleuritic, troponin is negative, check ECHO. GERD: c/w PPI. Chronic Pain: had a dilaudid pump at some point, c/w pain meds for now. Psychiatric Disorder: seems stable, monitor. Code Status: Full Code  Surrogate Decision Maker: sister Jose Ramon Ford [phone]  DVT Prophylaxis: Lovenox  GI Prophylaxis: on PPI  Baseline: fairly independent full ADL        Subjective:   CHIEF COMPLAINT: \"I feel weak, have chest pain and cough\"    HISTORY OF PRESENT ILLNESS:     Domingo Worthington is a 58 y.o.  male with pmhx significant for HTN, GERD, hepatitis (in the 1970s), and depression/anxiety, who presents ambulatory with wife at [facility] to the ED c/o epigastric pain that radiates up towards his middle chest x 1 week. Pt notes associated dark tarry stools, nausea, diarrhea, mild SOB, decreased appetite, and feeling lightheaded. Per wife, pt also appears pale as well. Pt states that his pain is exacerbated with yawning or taking a deep breath. Pt states that he does have a hx of heavy drinking for a   couple of years, and states that he has not drank alcohol in 22 years. Pt is currently on anticoagulation, Effient, which pt repots compliance with. Pt denies hx of GI bleeds.  Pt denies syncope, dizziness, vomiting, or hematemesis. At this time patient is lying in bed c/o chest pain, cough, while seeing in the ER, noted to be hypotensive and received 3L NS but BP still marginal, denies fever, no N/V no diarrhea, no urinary symptoms, no other associated symptoms. We were asked to admit for work up and evaluation of the above problems. Past Medical History:   Diagnosis Date    Arthritis     Chronic pain     GERD (gastroesophageal reflux disease)     Hypertension     Liver disease 1970'S    HX HEPATITIS    Nausea & vomiting     Psychiatric disorder     DEPRESSION AND ANXIETY        Past Surgical History:   Procedure Laterality Date    ENDOSCOPY VISIT-OUTPATIENT  2003    HX BACK SURGERY      LUMBAR SPINE X 7       Social History   Substance Use Topics    Smoking status: Current Every Day Smoker     Packs/day: 1.00     Years: 40.00    Smokeless tobacco: Never Used    Alcohol use No      Comment: RECOVERING ALCOHOLIC        Family History   Problem Relation Age of Onset    Cancer Father      BRAIN    Heart Disease Maternal Aunt     Stroke Maternal Aunt     Heart Disease Maternal Uncle     Stroke Maternal Uncle     Cancer Paternal Uncle      PROSTATE    Stroke Maternal Grandmother     Cancer Paternal Grandmother      BREAST    Heart Disease Sister     Heart Disease Sister      Allergies   Allergen Reactions    Latex Hives    Adhesive Rash    Codeine Hives     ITCHING, NAUSEA    Morphine Itching     NAUSEA        Prior to Admission medications    Medication Sig Start Date End Date Taking? Authorizing Provider   predniSONE (STERAPRED DS) 10 mg dose pack Taper as directed 7/23/17   Allison Alicia NP   tamsulosin [facility]) 0.4 mg capsule Take 0.4 mg by mouth daily. Elizabeth Bacon MD   prasugrel (EFFIENT) 10 mg tablet Take 10 mg by mouth daily. Elizabeth Bacon MD   atorvastatin (LIPITOR) 80 mg tablet Take 80 mg by mouth daily.     Elizabeth Bacon MD   zolpidem (AMBIEN) 10 mg tablet Take 10 mg by mouth nightly as needed for Sleep. Elizabeth Bacon MD   lisinopril (PRINIVIL, ZESTRIL) 10 mg tablet Take 10 mg by mouth daily. Elizabeth Bacon MD   ondansetron (ZOFRAN ODT) 4 mg disintegrating tablet Take 1 Tab by mouth every eight (8) hours as needed for Nausea. 6/23/16   Chandana De La Cruz MD   Omeprazole delayed release (PRILOSEC D/R) 20 mg tablet Take 20 mg by mouth every morning. Historical Provider   omega-3 fatty acids-vitamin e (FISH OIL) 1,000 mg Cap Take 1 Cap by mouth two (2) times a day. Historical Provider   cholecalciferol, vitamin d3, (VITAMIN D3) 1,000 unit tablet Take 1,000 Units by mouth daily. Historical Provider   ALPRAZolam [facility]) 2 mg tablet Take 2 mg by mouth two (2) times a day. Historical Provider       REVIEW OF SYSTEMS:     I am not able to complete the review of systems because:    The patient is intubated and sedated    The patient has altered mental status due to his acute medical problems    The patient has baseline aphasia from prior stroke(s)    The patient has baseline dementia and is not reliable historian    The patient is in acute medical distress and unable to provide information           Total of 12 systems reviewed as follows:       POSITIVE= underlined text  Negative = text not underlined  General:  fever, chills, sweats, generalized weakness, weight loss/gain,      loss of appetite   Eyes:    blurred vision, eye pain, loss of vision, double vision  ENT:    rhinorrhea, pharyngitis   Respiratory:   cough, sputum production, SOB, KELLEY, wheezing, pleuritic pain   Cardiology:   chest pain, palpitations, orthopnea, PND, edema, syncope   Gastrointestinal:  abdominal pain , N/V, diarrhea, dysphagia, constipation, bleeding   Genitourinary:  frequency, urgency, dysuria, hematuria, incontinence   Muskuloskeletal :  arthralgia, myalgia, back pain  Hematology:  easy bruising, nose or gum bleeding, lymphadenopathy   Dermatological: rash, ulceration, pruritis, color change / jaundice  Endocrine:   hot flashes or polydipsia   Neurological:  headache, dizziness, confusion, focal weakness, paresthesia,     Speech difficulties, memory loss, gait difficulty  Psychological: Feelings of anxiety, depression, agitation    Objective:   VITALS:    Visit Vitals    BP 92/51    Pulse 69    Temp 98 °F (36.7 °C)    Resp 16    Ht 5' 8\" (1.727 m)    Wt 78.5 kg (173 lb 1 oz)    SpO2 95%    BMI 26.31 kg/m2       PHYSICAL EXAM:    General:    Alert, cooperative, no distress, appears stated age. HEENT: Atraumatic, anicteric sclerae, pink conjunctivae     No oral ulcers, mucosa moist, throat clear, dentition poor  Neck:  Supple, symmetrical,  thyroid: non tender  Lungs:   Coarse BS, crackle sin right base  Chest wall:  No tenderness  No Accessory muscle use. Heart:   Regular  rhythm,  No  murmur   No edema  Abdomen:   Soft, non-tender. Not distended. Bowel sounds normal  Extremities: No cyanosis. No clubbing,      Skin turgor normal, Capillary refill normal, Radial  pulse 2+  Skin:     Not pale. Not Jaundiced  No rashes   Psych:  Good insight. Not depressed. Not anxious or agitated. Neurologic: EOMs intact. No facial asymmetry. No aphasia or slurred speech. Symmetrical strength, Sensation grossly intact.  Alert and oriented X 4.     _______________________________________________________________________  Care Plan discussed with:    Comments   Patient y    Family      RN y    Care Manager                    Consultant:      _______________________________________________________________________  Expected  Disposition:   Home with Family y   HH/PT/OT/RN y   SNF/LTC    CODEY    ________________________________________________________________________  TOTAL TIME:   Minutes    Critical Care Provided  61   Minutes non procedure based      Comments    y Reviewed previous records   >50% of visit spent in counseling and coordination of care y Discussion with patient and/or family and questions answered       ________________________________________________________________________  Signed: Enrique Shaw MD    Procedures: see electronic medical records for all procedures/Xrays and details which were not copied into this note but were reviewed prior to creation of Plan. LAB DATA REVIEWED:    Recent Results (from the past 24 hour(s))   EKG, 12 LEAD, INITIAL    Collection Time: 10/01/17  1:08 PM   Result Value Ref Range    Ventricular Rate 74 BPM    Atrial Rate 74 BPM    P-R Interval 132 ms    QRS Duration 86 ms    Q-T Interval 360 ms    QTC Calculation (Bezet) 399 ms    Calculated P Axis 55 degrees    Calculated R Axis 7 degrees    Calculated T Axis -9 degrees    Diagnosis       Normal sinus rhythm  Low voltage QRS  Inferior infarct , age undetermined  When compared with ECG of 18-JAN-2013 16:17,  Inferior infarct is now present  T wave inversion now evident in Inferior leads     PROTHROMBIN TIME + INR    Collection Time: 10/01/17  1:45 PM   Result Value Ref Range    INR 1.2 (H) 0.9 - 1.1      Prothrombin time 11.9 (H) 9.0 - 11.1 sec   PTT    Collection Time: 10/01/17  1:48 PM   Result Value Ref Range    aPTT 27.1 22.1 - 32.5 sec    aPTT, therapeutic range     58.0 - 77.0 SECS   TYPE & SCREEN    Collection Time: 10/01/17  1:48 PM   Result Value Ref Range    Crossmatch Expiration 10/04/2017     ABO/Rh(D) A POSITIVE     Antibody screen NEG    CBC WITH AUTOMATED DIFF    Collection Time: 10/01/17  1:48 PM   Result Value Ref Range    WBC 10.5 4.1 - 11.1 K/uL    RBC 4.36 4.10 - 5.70 M/uL    HGB 14.0 12.1 - 17.0 g/dL    HCT 39.2 36.6 - 50.3 %    MCV 89.9 80.0 - 99.0 FL    MCH 32.1 26.0 - 34.0 PG    MCHC 35.7 30.0 - 36.5 g/dL    RDW 14.3 11.5 - 14.5 %    PLATELET 632 846 - 675 K/uL    NEUTROPHILS 86 (H) 32 - 75 %    LYMPHOCYTES 7 (L) 12 - 49 %    MONOCYTES 7 5 - 13 %    EOSINOPHILS 0 0 - 7 %    BASOPHILS 0 0 - 1 %    ABS. NEUTROPHILS 9.1 (H) 1.8 - 8.0 K/UL    ABS.  LYMPHOCYTES 0.7 (L) 0.8 - 3.5 K/UL ABS. MONOCYTES 0.7 0.0 - 1.0 K/UL    ABS. EOSINOPHILS 0.0 0.0 - 0.4 K/UL    ABS. BASOPHILS 0.0 0.0 - 0.1 K/UL    RBC COMMENTS NORMOCYTIC, NORMOCHROMIC      DF SMEAR SCANNED     METABOLIC PANEL, COMPREHENSIVE    Collection Time: 10/01/17  1:48 PM   Result Value Ref Range    Sodium 135 (L) 136 - 145 mmol/L    Potassium 4.0 3.5 - 5.1 mmol/L    Chloride 103 97 - 108 mmol/L    CO2 25 21 - 32 mmol/L    Anion gap 7 5 - 15 mmol/L    Glucose 139 (H) 65 - 100 mg/dL    BUN 13 6 - 20 MG/DL    Creatinine 0.96 0.70 - 1.30 MG/DL    BUN/Creatinine ratio 14 12 - 20      GFR est AA >60 >60 ml/min/1.73m2    GFR est non-AA >60 >60 ml/min/1.73m2    Calcium 8.6 8.5 - 10.1 MG/DL    Bilirubin, total 0.9 0.2 - 1.0 MG/DL    ALT (SGPT) 74 12 - 78 U/L    AST (SGOT) 60 (H) 15 - 37 U/L    Alk.  phosphatase 124 (H) 45 - 117 U/L    Protein, total 7.3 6.4 - 8.2 g/dL    Albumin 2.3 (L) 3.5 - 5.0 g/dL    Globulin 5.0 (H) 2.0 - 4.0 g/dL    A-G Ratio 0.5 (L) 1.1 - 2.2     LIPASE    Collection Time: 10/01/17  1:48 PM   Result Value Ref Range    Lipase 166 73 - 393 U/L   CK W/ CKMB & INDEX    Collection Time: 10/01/17  1:48 PM   Result Value Ref Range    CK 21 (L) 39 - 308 U/L    CK - MB <1.0 <3.6 NG/ML    CK-MB Index Cannot be calculated 0 - 2.5     MAGNESIUM    Collection Time: 10/01/17  1:48 PM   Result Value Ref Range    Magnesium 2.0 1.6 - 2.4 mg/dL   TROPONIN I    Collection Time: 10/01/17  1:48 PM   Result Value Ref Range    Troponin-I, Qt. <0.04 <0.05 ng/mL   FERRITIN    Collection Time: 10/01/17  1:48 PM   Result Value Ref Range    Ferritin 399 (H) 26 - 388 NG/ML   IRON PROFILE    Collection Time: 10/01/17  1:48 PM   Result Value Ref Range    Iron 28 (L) 35 - 150 ug/dL    TIBC 202 (L) 250 - 450 ug/dL    Iron % saturation 14 (L) 20 - 50 %   VITAMIN B12    Collection Time: 10/01/17  1:48 PM   Result Value Ref Range    Vitamin B12 612 211 - 911 pg/mL   FOLATE    Collection Time: 10/01/17  1:48 PM   Result Value Ref Range    Folate 10.4 5.0 - 21.0 ng/mL   OCCULT BLOOD, STOOL    Collection Time: 10/01/17  3:35 PM   Result Value Ref Range    Occult blood, stool NEGATIVE  NEG     LACTIC ACID    Collection Time: 10/01/17  7:08 PM   Result Value Ref Range    Lactic acid 1.0 0.4 - 2.0 MMOL/L

## 2017-10-02 NOTE — PROGRESS NOTES
Problem: Mobility Impaired (Adult and Pediatric)  Goal: *Acute Goals and Plan of Care (Insert Text)  Physical Therapy Goals  Initiated 10/2/2017    1. Patient will transfer from bed to chair and chair to bed with independence using the least restrictive device within 7 day(s). 2. Patient will perform sit to stand with independence within 7 day(s). 3. Patient will ambulate with independence for 350 feet with the least restrictive device within 7 day(s). 4. Patient will ascend/descend 2 stairs with 1 handrail(s) with independence within 7 day(s). PHYSICAL THERAPY EVALUATION  Patient: Efe Clark (66 y.o. male)  Date: 10/2/2017  Primary Diagnosis: Septic shock [facility])        Precautions:   Fall      ASSESSMENT :  Based on the objective data described below, the patient presents with orthostatic hypotension, lightheadedness with position changes and functional mobility below his baseline following admission for septic shock. Pt well below his baseline level of independence without AD. Today eager to get out of bed and cleared by nursing for mobility if BP stable. Pt's pressures 116/73>98/65>95/58. Attempted performing exercises at EOB to raise BP however pressures dropped with prolonged sitting. Stood under his own power with close guard for safety. Deferred further evaluation and gait training at this time due to safety concerns. Left sitting up in bed with tray table set up for lunch. Will follow up tomorrow to assess gait training. Anticipate once medically stable pt will be safe to return home with his sister. Patient will benefit from skilled intervention to address the above impairments.   Patients rehabilitation potential is considered to be Fair  Factors which may influence rehabilitation potential include:   [ ]         None noted  [ ]         Mental ability/status  [X]         Medical condition  [ ]         Home/family situation and support systems  [ ]         Safety awareness  [ ]         Pain tolerance/management  [ ]         Other:        PLAN :  Recommendations and Planned Interventions:  [X]           Bed Mobility Training             [X]    Neuromuscular Re-Education  [X]           Transfer Training                   [ ]    Orthotic/Prosthetic Training  [X]           Gait Training                         [ ]    Modalities  [X]           Therapeutic Exercises           [ ]    Edema Management/Control  [X]           Therapeutic Activities            [X]    Patient and Family Training/Education  [ ]           Other (comment):     Frequency/Duration: Patient will be followed by physical therapy  5 times a week to address goals. Discharge Recommendations: To Be Determined  Further Equipment Recommendations for Discharge: None       SUBJECTIVE:   Patient stated I am a bit lightheaded but it isn't bad.       OBJECTIVE DATA SUMMARY:   HISTORY:    Past Medical History:   Diagnosis Date    Arthritis      Chronic pain      GERD (gastroesophageal reflux disease)      Hypertension      Liver disease 1970'S     HX HEPATITIS    Nausea & vomiting      Psychiatric disorder       DEPRESSION AND ANXIETY     Past Surgical History:   Procedure Laterality Date    ENDOSCOPY VISIT-OUTPATIENT   2003    HX BACK SURGERY         LUMBAR SPINE X 7     Prior Level of Function/Home Situation: Independent. Pt drives, does not work and lives with his sister.    Personal factors and/or comorbidities impacting plan of care: [facility]     Home Situation  Home Environment: Private residence  # Steps to Enter: 4  Rails to Enter: Yes  One/Two Story Residence: Two story  # of Interior Steps: 14  Living Alone: No  Support Systems: Family member(s), Friends \ neighbors (lives with his sister and brother-in-law)  Patient Expects to be Discharged to[de-identified] Private residence  Current DME Used/Available at Home: Cane, straight, Raised toilet seat, Shower chair, Walker, rolling  Tub or Shower Type: Tub/Shower combination to determine the severity of the functional limitation was the TUG with a score of 0seconds which was correlated with the impairment scale. · Mobility - Walking and Moving Around:               - CURRENT STATUS:    CN - 100% impaired, limited or restricted               - GOAL STATUS:           CJ - 20%-39% impaired, limited or restricted               - D/C STATUS:                       ---------------To be determined---------------      Physical Therapy Evaluation Charge Determination   History Examination Presentation Decision-Making   MEDIUM  Complexity : 1-2 comorbidities / personal factors will impact the outcome/ POC  MEDIUM Complexity : 3 Standardized tests and measures addressing body structure, function, activity limitation and / or participation in recreation  LOW Complexity : Stable, uncomplicated  LOW Complexity : FOTO score of       Based on the above components, the patient evaluation is determined to be of the following complexity level: LOW      Activity Tolerance:   Fair  Please refer to the flowsheet for vital signs taken during this treatment. After treatment:   [ ]         Patient left in no apparent distress sitting up in chair  [X]         Patient left in no apparent distress in bed  [X]         Call bell left within reach  [X]         Nursing notified  [ ]         Caregiver present  [ ]         Bed alarm activated      COMMUNICATION/EDUCATION:   The patients plan of care was discussed with: Registered Nurse.  [X]         Fall prevention education was provided and the patient/caregiver indicated understanding. [X]         Patient/family have participated as able in goal setting and plan of care. [X]         Patient/family agree to work toward stated goals and plan of care. [ ]         Patient understands intent and goals of therapy, but is neutral about his/her participation. [ ]         Patient is unable to participate in goal setting and plan of care. Thank you for this referral.  Edel Maynard, PT   Time Calculation: 24 mins

## 2017-10-02 NOTE — PROGRESS NOTES
TRANSFER - IN REPORT:    Verbal report received from YnesRN(name) on Tari Litten  being received from ED(unit) for routine progression of care      Report consisted of patients Situation, Background, Assessment and   Recommendations(SBAR). Information from the following report(s) Kardex, ED Summary, MAR, Recent Results and Cardiac Rhythm NSR was reviewed with the receiving nurse. Opportunity for questions and clarification was provided. Assessment completed upon patients arrival to unit and care assumed.

## 2017-10-02 NOTE — ROUTINE PROCESS
Bedside, Verbal and Written shift change report given to Liv RN  (oncoming nurse) by Gracie Mccoy RN  (offgoing nurse). Report included the following information SBAR, Kardex, MAR and Recent Results.

## 2017-10-02 NOTE — ED NOTES
TRANSFER - OUT REPORT:    Verbal report given to Liv RN (name) on Lonza Phoenix  being transferred to Room 2247 pcu (unit) for routine progression of care       Report consisted of patients Situation, Background, Assessment and   Recommendations(SBAR). Information from the following report(s) SBAR, ED Summary, [facility] and Recent Results was reviewed with the receiving nurse. Lines:   Triple Lumen 10/01/17 Right Internal jugular (Active)       Peripheral IV 10/01/17 Left Antecubital (Active)   Site Assessment Clean, dry, & intact 10/1/2017  1:21 PM   Phlebitis Assessment 0 10/1/2017  1:21 PM   Infiltration Assessment 0 10/1/2017  1:21 PM   Dressing Status Clean, dry, & intact 10/1/2017  1:21 PM   Dressing Type Tape 10/1/2017  1:21 PM   Hub Color/Line Status Pink;Flushed 10/1/2017  1:21 PM   Action Taken Blood drawn 10/1/2017  1:21 PM        Opportunity for questions and clarification was provided.       Patient transported with:   Monitor  Registered Nurse

## 2017-10-02 NOTE — PROGRESS NOTES
Occupational Therapy Goals  Initiated 10/2/2017  1. Patient will perform grooming standing at sink with independence within 7 day(s). 2.  Patient will perform sponge bathing with independence within 7 day(s). 3.  Patient will perform lower body dressing with independence within 7 day(s). 4.  Patient will perform toilet transfers with modified independence within 7 day(s). 5.  Patient will perform all aspects of toileting with independence within 7 day(s). 6.  Patient will perform ADL setup with independence within 7 day(s). Occupational Therapy EVALUATION  Patient: Sandrine Erickson (12 y.o. male)  Date: 10/2/2017  Primary Diagnosis: Septic shock [facility])        Precautions:   Fall    ASSESSMENT :  Based on the objective data described below, the patient presents with chronic back pain, improving hypotension, GW, decreased activity tolerance and decreased balance which is impairing his functional independence. He is functioning slightly below his independent baseline, now performing ADLs at a supervision to min A level, and is supervision to [place] [id number] for transfers. Patient will benefit from acute skilled intervention to address the above impairments, but no OT needs anticipated for after discharge.  Patients rehabilitation potential is considered to be Good  Factors which may influence rehabilitation potential include:   []             None noted  []             Mental ability/status  [x]             Medical condition  []             Home/family situation and support systems  []             Safety awareness  []             Pain tolerance/management  []             Other:      PLAN :  Recommendations and Planned Interventions:  [x]               Self Care Training                  []        Therapeutic Activities  [x]               Functional Mobility Training    []        Cognitive Retraining  []               Therapeutic Exercises           [x]        Endurance Activities  [x]               Balance Training []        Neuromuscular Re-Education  []               Visual/Perceptual Training     [x]   Home Safety Training  [x]               Patient Education                 [x]        Family Training/Education  []               Other (comment):    Frequency/Duration: Patient will be followed by occupational therapy 4 times a week to address goals. Discharge Recommendations: None anticipated   Further Equipment Recommendations for Discharge: TBD, likely none         OBJECTIVE DATA SUMMARY:     Past Medical History:   Diagnosis Date    Arthritis     Chronic pain     GERD (gastroesophageal reflux disease)     Hypertension     Liver disease 1970'S    HX HEPATITIS    Nausea & vomiting     Psychiatric disorder     DEPRESSION AND ANXIETY     Past Surgical History:   Procedure Laterality Date    ENDOSCOPY VISIT-OUTPATIENT  2003    HX BACK SURGERY      LUMBAR SPINE X 7     Prior Level of Function/Home Situation: independent  Expanded or extensive additional review of patient history:     Home Situation  Home Environment: Private residence  # Steps to Enter: 4  Rails to Enter: Yes  One/Two Story Residence: Two story  # of Interior Steps: 14  Living Alone: No  Support Systems: Family member(s), Friends \ neighbors (lives with his sister and brother-in-law)  Patient Expects to be Discharged to[de-identified] Private residence  Current DME Used/Available at Home: Cane, straight, Raised toilet seat, Shower chair, Walker, rolling  Tub or Shower Type: Tub/Shower combination  [x]  Right hand dominant   []  Left hand dominant  Cognitive/Behavioral Status:  Neurologic State: Alert  Orientation Level: Oriented X4  Cognition: Follows commands  Perception: Appears intact  Perseveration: No perseveration noted  Safety/Judgement: Awareness of environment; Insight into deficits    Vision/Perceptual:           Acuity: Within Defined Limits       Range of Motion:  AROM: Generally decreased, functional     Strength:  Strength: Generally decreased, functional  Coordination:  Coordination: Within functional limits          Tone & Sensation:  Tone: Normal  Balance:  Sitting: Intact  Standing: Impaired  Standing - Static: Good  Standing - Dynamic : Fair  Functional Mobility and Transfers for ADLs:  Bed Mobility:  Rolling: Modified independent  Supine to Sit: Modified independent  Sit to Supine: Independent  Scooting: Independent  Transfers:  Sit to Stand: Stand-by asssistance     Bed to Chair: Contact guard assistance                         ADL Assessment:  Feeding: Independent    Oral Facial Hygiene/Grooming: Contact guard assistance    Bathing: Contact guard assistance    Upper Body Dressing: Supervision;Setup    Lower Body Dressing: Minimum assistance    Toileting: Contact guard assistance                Functional Measure:  Barthel Index:    Bathin  Bladder: 10  Bowels: 10  Groomin  Dressin  Feeding: 10  Mobility: 0  Stairs: 0  Toilet Use: 5  Transfer (Bed to Chair and Back): 10  Total: 50       Barthel and G-code impairment scale:  Percentage of impairment CH  0% CI  1-19% CJ  20-39% CK  40-59% CL  60-79% CM  80-99% CN  100%   Barthel Score 0-100 100 99-80 79-60 59-40 20-39 1-19   0   Barthel Score 0-20 20 17-19 13-16 9-12 5-8 1-4 0      The Barthel ADL Index: Guidelines  1. The index should be used as a record of what a patient does, not as a record of what a patient could do. 2. The main aim is to establish degree of independence from any help, physical or verbal, however minor and for whatever reason. 3. The need for supervision renders the patient not independent. 4. A patient's performance should be established using the best available evidence. Asking the patient, friends/relatives and nurses are the usual sources, but direct observation and common sense are also important. However direct testing is not needed.   5. Usually the patient's performance over the preceding 24-48 hours is important, but occasionally longer periods will be relevant. 6. Middle categories imply that the patient supplies over 50 per cent of the effort. 7. Use of aids to be independent is allowed. Maribell Garner, Barthel, D.W. (7674). Functional evaluation: the Barthel Index. [address] [facility] (14)2. RICARDO Stephenson, Jaymie Rausch., [facility], [address] [place] [address] (1999). Measuring the change indisability after inpatient rehabilitation; comparison of the responsiveness of the Barthel Index and Functional Cole Measure. Journal of Neurology, Neurosurgery, and Psychiatry, 66(4), 734-804. Nehal Enriquez, NALEX.A, IGNACIO Vance, & Berta Loya M.A. (2004.) Assessment of post-stroke quality of life in cost-effectiveness studies: The usefulness of the Barthel Index and the EuroQoL-5D. Quality of Life Research, 13, 427-43       In compliance with CMSs Claims Based Outcome Reporting, the following G-code set was chosen for this patient based on their primary functional limitation being treated: The outcome measure chosen to determine the severity of the functional limitation was the Barthel Index with a score of 50/100 which was correlated with the impairment scale. ? Self Care:     - CURRENT STATUS: CK - 40%-59% impaired, limited or restricted    - GOAL STATUS:  CI - 1%-19% impaired, limited or restricted    - D/C STATUS:  ---------------To be determined---------------         ADL Intervention and task modifications:  Initiated bed mobility, dressing ADL, transfer and safety training. Pain:  Pain Scale 1: Numeric (0 - 10)  Pain Intensity 1: 4  Pain Location 1: Back; Hip  Pain Orientation 1: Lower;Left;Right  Pain Description 1: Aching; Sore  Pain Intervention(s) 1: Repositioned; Rest  Activity Tolerance:   Orthostatics  Supine: /62  Sitting:  BP 95/61  Sitting after 2 minutes: /64  Standing: BP 93/61  Sitting after transfer to chair: BP 98/62  Please refer to the flowsheet for vital signs taken during this treatment. After treatment:   [x] Patient left in no apparent distress sitting up in chair  [] Patient left in no apparent distress in bed  [x] Call bell left within reach  [x] Nursing notified  [] Caregiver present  [] Bed alarm activated    COMMUNICATION/EDUCATION:   The patients plan of care was discussed with: Registered Nurse. [x] Home safety education was provided and the patient/caregiver indicated understanding. [x] Patient/family have participated as able in goal setting and plan of care. [x] Patient/family agree to work toward stated goals and plan of care. [] Patient understands intent and goals of therapy, but is neutral about his/her participation. [] Patient is unable to participate in goal setting and plan of care. This patients plan of care is appropriate for delegation to [facility].     Thank you for this referral.  Amador Sanders, OTR/L  Time Calculation: 33 mins

## 2017-10-03 VITALS
TEMPERATURE: 97.8 F | RESPIRATION RATE: 18 BRPM | BODY MASS INDEX: 26.06 KG/M2 | WEIGHT: 175.93 LBS | HEIGHT: 69 IN | OXYGEN SATURATION: 99 % | DIASTOLIC BLOOD PRESSURE: 68 MMHG | HEART RATE: 68 BPM | SYSTOLIC BLOOD PRESSURE: 112 MMHG

## 2017-10-03 LAB
ANION GAP SERPL CALC-SCNC: 6 MMOL/L (ref 5–15)
ATRIAL RATE: 74 BPM
BUN SERPL-MCNC: 6 MG/DL (ref 6–20)
BUN/CREAT SERPL: 10 (ref 12–20)
CALCIUM SERPL-MCNC: 7.9 MG/DL (ref 8.5–10.1)
CALCULATED P AXIS, ECG09: 55 DEGREES
CALCULATED R AXIS, ECG10: 7 DEGREES
CALCULATED T AXIS, ECG11: -9 DEGREES
CHLORIDE SERPL-SCNC: 112 MMOL/L (ref 97–108)
CO2 SERPL-SCNC: 25 MMOL/L (ref 21–32)
CREAT SERPL-MCNC: 0.59 MG/DL (ref 0.7–1.3)
DIAGNOSIS, 93000: NORMAL
ERYTHROCYTE [DISTWIDTH] IN BLOOD BY AUTOMATED COUNT: 14.7 % (ref 11.5–14.5)
GLUCOSE BLD STRIP.AUTO-MCNC: 123 MG/DL (ref 65–100)
GLUCOSE BLD STRIP.AUTO-MCNC: 98 MG/DL (ref 65–100)
GLUCOSE SERPL-MCNC: 109 MG/DL (ref 65–100)
HCT VFR BLD AUTO: 32.8 % (ref 36.6–50.3)
HGB BLD-MCNC: 11.6 G/DL (ref 12.1–17)
MCH RBC QN AUTO: 32.2 PG (ref 26–34)
MCHC RBC AUTO-ENTMCNC: 35.4 G/DL (ref 30–36.5)
MCV RBC AUTO: 91.1 FL (ref 80–99)
P-R INTERVAL, ECG05: 132 MS
PLATELET # BLD AUTO: 135 K/UL (ref 150–400)
POTASSIUM SERPL-SCNC: 3.9 MMOL/L (ref 3.5–5.1)
Q-T INTERVAL, ECG07: 360 MS
QRS DURATION, ECG06: 86 MS
QTC CALCULATION (BEZET), ECG08: 399 MS
RBC # BLD AUTO: 3.6 M/UL (ref 4.1–5.7)
SERVICE CMNT-IMP: ABNORMAL
SERVICE CMNT-IMP: NORMAL
SODIUM SERPL-SCNC: 143 MMOL/L (ref 136–145)
VENTRICULAR RATE, ECG03: 74 BPM
WBC # BLD AUTO: 3.3 K/UL (ref 4.1–11.1)

## 2017-10-03 PROCEDURE — 94640 AIRWAY INHALATION TREATMENT: CPT

## 2017-10-03 PROCEDURE — 74011250636 HC RX REV CODE- 250/636: Performed by: INTERNAL MEDICINE

## 2017-10-03 PROCEDURE — 85027 COMPLETE CBC AUTOMATED: CPT | Performed by: INTERNAL MEDICINE

## 2017-10-03 PROCEDURE — 36415 COLL VENOUS BLD VENIPUNCTURE: CPT | Performed by: INTERNAL MEDICINE

## 2017-10-03 PROCEDURE — 82962 GLUCOSE BLOOD TEST: CPT

## 2017-10-03 PROCEDURE — 97116 GAIT TRAINING THERAPY: CPT

## 2017-10-03 PROCEDURE — 74011000258 HC RX REV CODE- 258: Performed by: INTERNAL MEDICINE

## 2017-10-03 PROCEDURE — 74011250637 HC RX REV CODE- 250/637: Performed by: INTERNAL MEDICINE

## 2017-10-03 PROCEDURE — 80048 BASIC METABOLIC PNL TOTAL CA: CPT | Performed by: INTERNAL MEDICINE

## 2017-10-03 PROCEDURE — 74011000250 HC RX REV CODE- 250: Performed by: INTERNAL MEDICINE

## 2017-10-03 PROCEDURE — 97535 SELF CARE MNGMENT TRAINING: CPT | Performed by: OCCUPATIONAL THERAPIST

## 2017-10-03 RX ORDER — HYDROMORPHONE HYDROCHLORIDE 2 MG/ML
0.5 INJECTION, SOLUTION INTRAMUSCULAR; INTRAVENOUS; SUBCUTANEOUS
Status: DISCONTINUED | OUTPATIENT
Start: 2017-10-03 | End: 2017-10-03

## 2017-10-03 RX ORDER — HYDROMORPHONE HYDROCHLORIDE 2 MG/ML
0.5 INJECTION, SOLUTION INTRAMUSCULAR; INTRAVENOUS; SUBCUTANEOUS
Status: DISCONTINUED | OUTPATIENT
Start: 2017-10-03 | End: 2017-10-03 | Stop reason: HOSPADM

## 2017-10-03 RX ORDER — TAMSULOSIN HYDROCHLORIDE 0.4 MG/1
0.4 CAPSULE ORAL
Status: DISCONTINUED | OUTPATIENT
Start: 2017-10-04 | End: 2017-10-03 | Stop reason: HOSPADM

## 2017-10-03 RX ORDER — LEVOFLOXACIN 750 MG/1
750 TABLET ORAL DAILY
Qty: 7 TAB | Refills: 0 | Status: SHIPPED | OUTPATIENT
Start: 2017-10-03 | End: 2017-10-10

## 2017-10-03 RX ORDER — IPRATROPIUM BROMIDE AND ALBUTEROL SULFATE 2.5; .5 MG/3ML; MG/3ML
3 SOLUTION RESPIRATORY (INHALATION)
Status: DISCONTINUED | OUTPATIENT
Start: 2017-10-03 | End: 2017-10-03 | Stop reason: HOSPADM

## 2017-10-03 RX ADMIN — PRASUGREL HYDROCHLORIDE 10 MG: 10 TABLET, FILM COATED ORAL at 08:07

## 2017-10-03 RX ADMIN — ACETAMINOPHEN 650 MG: 325 TABLET ORAL at 03:15

## 2017-10-03 RX ADMIN — ACETAMINOPHEN 650 MG: 325 TABLET ORAL at 08:37

## 2017-10-03 RX ADMIN — PANTOPRAZOLE SODIUM 40 MG: 40 TABLET, DELAYED RELEASE ORAL at 08:08

## 2017-10-03 RX ADMIN — IPRATROPIUM BROMIDE AND ALBUTEROL SULFATE 3 ML: .5; 3 SOLUTION RESPIRATORY (INHALATION) at 07:54

## 2017-10-03 RX ADMIN — ACETAMINOPHEN 650 MG: 325 TABLET ORAL at 12:43

## 2017-10-03 RX ADMIN — HYDROMORPHONE HYDROCHLORIDE 0.5 MG: 2 INJECTION INTRAMUSCULAR; INTRAVENOUS; SUBCUTANEOUS at 13:14

## 2017-10-03 RX ADMIN — ONDANSETRON 4 MG: 2 INJECTION INTRAMUSCULAR; INTRAVENOUS at 02:42

## 2017-10-03 RX ADMIN — Medication 10 ML: at 07:05

## 2017-10-03 RX ADMIN — CEFEPIME HYDROCHLORIDE 2 G: 2 INJECTION, POWDER, FOR SOLUTION INTRAVENOUS at 13:01

## 2017-10-03 RX ADMIN — Medication 10 ML: at 13:02

## 2017-10-03 RX ADMIN — CEFEPIME HYDROCHLORIDE 2 G: 2 INJECTION, POWDER, FOR SOLUTION INTRAVENOUS at 07:05

## 2017-10-03 RX ADMIN — GUAIFENESIN 400 MG: 200 SOLUTION ORAL at 08:09

## 2017-10-03 RX ADMIN — ALPRAZOLAM 2 MG: 0.5 TABLET ORAL at 08:07

## 2017-10-03 RX ADMIN — ASPIRIN 81 MG 81 MG: 81 TABLET ORAL at 08:07

## 2017-10-03 NOTE — PROGRESS NOTES
Problem: Mobility Impaired (Adult and Pediatric)  Goal: *Acute Goals and Plan of Care (Insert Text)  Physical Therapy Goals  Initiated 10/2/2017    1. Patient will transfer from bed to chair and chair to bed with independence using the least restrictive device within 7 day(s). 2. Patient will perform sit to stand with independence within 7 day(s). 3. Patient will ambulate with independence for 350 feet with the least restrictive device within 7 day(s). 4. Patient will ascend/descend 2 stairs with 1 handrail(s) with independence within 7 day(s). PHYSICAL THERAPY TREATMENT  Patient: Jamila Burch (11 y.o. male)  Date: 10/3/2017  Diagnosis: Septic shock ([facility] [address] <principal problem not specified>       Precautions: Fall      ASSESSMENT:  Much improved today. BP remains low however no dramatic drop with positional changes. Pt did not complain of lightheadedness today. Performed transfers and gait training with supervision. Assisted into bathroom prior to session, pt independent with self-care. Ambulated 350ft with no overt LOB, buckling on SOB noted. Pt was not challenged by gait with eyes closed, head turns or dual tasks. Pt reports he is near his baseline at this time. No further skilled therapy required at discharge. Progression toward goals:  [X]    Improving appropriately and progressing toward goals  [ ]    Improving slowly and progressing toward goals  [ ]    Not making progress toward goals and plan of care will be adjusted       PLAN:  Patient continues to benefit from skilled intervention to address the above impairments. Continue treatment per established plan of care. Discharge Recommendations:  None  Further Equipment Recommendations for Discharge:  None       SUBJECTIVE:   Patient stated I do stairs all day long up and down 50 times.       OBJECTIVE DATA SUMMARY:   Critical Behavior:  Neurologic State: Alert  Orientation Level: Oriented X4  Cognition: Appropriate decision making, Appropriate for age attention/concentration, Appropriate safety awareness, Follows commands  Safety/Judgement: Awareness of environment, Insight into deficits  Functional Mobility Training:  Bed Mobility:  Rolling: Independent  Supine to Sit: Independent     Scooting: Independent        Transfers:  Sit to Stand: Supervision  Stand to Sit: Supervision        Bed to Chair: Independent                    Balance:  Sitting: Intact  Standing: Intact  Standing - Static: Good  Standing - Dynamic : Good  Ambulation/Gait Training:  Distance (ft): 350 Feet (ft)  Assistive Device: Gait belt  Ambulation - Level of Assistance: Supervision        Gait Abnormalities: Decreased step clearance        Base of Support: Narrowed     Speed/Nida: Pace decreased (<100 feet/min)                                Neuro Re-Education:  Gait with head turns, eyes closed, narrow KRISTIAN  Activity Tolerance:   Good  Please refer to the flowsheet for vital signs taken during this treatment.   After treatment:   [X]    Patient left in no apparent distress sitting up in chair  [ ]    Patient left in no apparent distress in bed  [X]    Call bell left within reach  [X]    Nursing notified  [ ]    Caregiver present  [ ]    Bed alarm activated      COMMUNICATION/COLLABORATION:   The patients plan of care was discussed with: Registered Nurse     Haider Frausto, PT   Time Calculation: 18 mins

## 2017-10-03 NOTE — PROGRESS NOTES
PCU SHIFT NURSING NOTE      {[facility] BEDSIDE_VERBAL_RECORDED_WRITTEN:[id number]::Bedside shift change report given to Liv (oncoming nurse) by Andrew Vargas (offgoing nurse). Report included the following information SBAR, Kardex, Recent Results and Cardiac Rhythm NSR. Shift Summary:    Admission Date 10/1/2017   Admission Diagnosis Septic shock ([address]   Consults None        Consults   []PT   []OT   []Speech   []Case Management      [] Palliative      Cardiac Monitoring Order   []Yes   []No     IV drips   []Yes    Drip:                            Dose:  Drip:                            Dose:  Drip:                            Dose:   []No     GI Prophylaxis   []Yes   []No         DVT Prophylaxis   SCDs:             Isaias stockings:         [] Medication   []Contraindicated   []None      Activity Level Activity Level: Up with Assistance     Activity Assistance: Partial (one person)   Purposeful Rounding every 1-2 hour? []Yes   Perez Score  Total Score: 2   Bed Alarm (If score 3 or >)   []Yes   [] Refused (See signed refusal form in chart)   Juma Score  Juma Score: 20   Juma Score (if score 14 or less)   []PMT consult   []Wound Care consult      []Specialty bed   [] Nutrition consult          Needs prior to discharge:   Home O2 required:    []Yes   []No    If yes, how much O2 required?     Other:    Last Bowel Movement: Last Bowel Movement Date: 10/01/17      Influenza Vaccine Received Flu Vaccine for Current Season (usually Sept-March): No    Patient/Guardian Refused (Notify MD): Yes   Pneumonia Vaccine           Diet Active Orders   Diet    DIET REGULAR      LDAs           Triple Lumen 10/01/17 Right Internal jugular (Active)   Central Line Being Utilized Yes 10/2/2017  8:00 PM   Criteria for Appropriate Use Irritant/vesicant medication 10/2/2017  8:00 PM   Site Assessment Clean, dry, & intact 10/2/2017  8:00 PM   Infiltration Assessment 0 10/2/2017  8:00 PM   Affected Extremity/Extremities Color distal to insertion site pink (or appropriate for race) 10/2/2017  8:00 PM   Date of Last Dressing Change 10/01/17 10/2/2017  8:00 PM   Dressing Status Clean, dry, & intact 10/2/2017  8:00 PM   Dressing Type Disk with Chlorhexadine gluconate (CHG); Transparent 10/2/2017  8:00 PM   Proximal Hub Color/Line Status Brown;Capped;Flushed 10/2/2017  3:35 PM   Positive Blood Return (Medial Site) Yes 10/2/2017  3:35 PM   Medial Hub Color/Line Status Blue;Capped;Flushed 10/2/2017  3:35 PM   Positive Blood Return (Lateral Site) Yes 10/2/2017  3:35 PM   Distal Hub Color/Line Status White; Infusing;Flushed 10/2/2017  3:35 PM   Positive Blood Return (Site #3) Yes 10/2/2017  3:35 PM   Alcohol Cap Used Yes 10/2/2017  8:05 AM        Peripheral IV 10/01/17 Left Antecubital (Active)   Site Assessment Clean, dry, & intact 10/2/2017  8:00 PM   Phlebitis Assessment 0 10/2/2017  8:00 PM   Infiltration Assessment 0 10/2/2017  8:00 PM   Dressing Status Clean, dry, & intact 10/2/2017  8:00 PM   Dressing Type Transparent 10/2/2017  8:00 PM   Hub Color/Line Status Pink;Capped 10/2/2017  3:35 PM   Action Taken Blood drawn 10/1/2017  1:21 PM                      Urinary Catheter      Intake & Output   Date 10/01/17 1900 - 10/02/17 0659 10/02/17 0700 - 10/03/17 0659   Shift 6228-0650 24 Hour Total 8924-8098 9118-1385 24 Hour Total   I  N  T  A  K  E   P.O. 1010 1010  240 240      P. O. 1010 1010  240 240    I.V.  (mL/kg/hr) 67.5 67.5  1500 1500      Levophed Volume 48.7 48.7         Volume (0.9% sodium chloride infusion) 18.8 18.8  1500 1500    Shift Total  (mL/kg) 1077.5  (13.3) 1077.5  (13.3)  1740  (21.8) 1740  (21.8)   O  U  T  P  U  T   Urine  (mL/kg/hr) 1525 1525 775  (0.8) 400 1175      Urine Voided 1525 1525     Shift Total  (mL/kg) 1525  (18.8) 1525  (18.8) 775  (9.7) 400  (5) 1175  (14.7)   NET -447.5 -447.5 -775 1340 565   Weight (kg) 81.1 81.1 79.9 79.9 79.9         Readmission Risk Assessment Tool Score Medium Risk            15       Total Score        3 Has Seen PCP in Last 6 Months (Yes=3, No=0)    4 Pt. Coverage (Medicare=5 , Medicaid, or Self-Pay=4)    8 Charlson Comorbidity Score (Age + Comorbid Conditions)        Criteria that do not apply:    . Living with Significant Other. Assisted Living. LTAC. SNF.  or   Rehab    Patient Length of Stay (>5 days = 3)    IP Visits Last 12 Months (1-3=4, 4=9, >4=11)       Expected Length of Stay 4d 21h   Actual Length of Stay 1              \

## 2017-10-03 NOTE — PROGRESS NOTES
PCU SHIFT NURSING NOTE      Bedside and Verbal shift change report given to Chaitanya Sneed RN/Joanna Alex RN (oncoming nurse) by Zarina Renteria RN (offgoing nurse). Report included the following information SBAR, Kardex, ED Summary, Procedure Summary, Intake/Output, MAR, Recent Results, Med Rec Status, Cardiac Rhythm NSR and Alarm Parameters . Shift Summary:     1600--Patient transported home with sister at side. Admission Date 10/1/2017   Admission Diagnosis Septic shock ([address]   Consults None        Consults   [x]PT   [x]OT   []Speech   [x]Case Management      [] Palliative      Cardiac Monitoring Order   [x]Yes   []No     IV drips   []Yes    Drip:                            Dose:  Drip:                            Dose:  Drip:                            Dose:   [x]No     GI Prophylaxis   [x]Yes   []No         DVT Prophylaxis   SCDs:             Isaias stockings:         [x] Medication   []Contraindicated   []None      Activity Level Activity Level: Up with Assistance     Activity Assistance: Partial (one person)   Purposeful Rounding every 1-2 hour? [x]Yes   Perez Score  Total Score: 2   Bed Alarm (If score 3 or >)   [x]Yes   [] Refused (See signed refusal form in chart)   Juma Score  Juma Score: 20   Juma Score (if score 14 or less)   [x]PMT consult   []Wound Care consult      []Specialty bed   [x] Nutrition consult          Needs prior to discharge:   Home O2 required:    []Yes   [x]No    If yes, how much O2 required?     Other:    Last Bowel Movement: Last Bowel Movement Date: 10/01/17      Influenza Vaccine Received Flu Vaccine for Current Season (usually Sept-March): No    Patient/Guardian Refused (Notify MD): Yes   Pneumonia Vaccine           Diet Active Orders   Diet    DIET REGULAR      LDAs           Triple Lumen 10/01/17 Right Internal jugular (Active)   Central Line Being Utilized Yes 10/3/2017 11:52 AM   Criteria for Appropriate Use Irritant/vesicant medication 10/3/2017 11:52 AM   Site Assessment Clean, dry, & intact 10/3/2017 11:52 AM   Infiltration Assessment 0 10/3/2017 11:52 AM   Affected Extremity/Extremities Color distal to insertion site pink (or appropriate for race) 10/3/2017 11:52 AM   Date of Last Dressing Change 10/01/17 10/3/2017 11:52 AM   Dressing Status Clean, dry, & intact 10/3/2017 11:52 AM   Dressing Type Disk with Chlorhexadine gluconate (CHG); Transparent 10/3/2017 11:52 AM   Action Taken Open ports on tubing capped 10/3/2017 11:52 AM   Proximal Hub Color/Line Status Brown;Capped;Flushed 10/3/2017 11:52 AM   Positive Blood Return (Medial Site) Yes 10/3/2017 11:52 AM   Medial Hub Color/Line Status Blue;Capped;Flushed 10/3/2017 11:52 AM   Positive Blood Return (Lateral Site) Yes 10/3/2017 11:52 AM   Distal Hub Color/Line Status White; Infusing;Flushed 10/3/2017 11:52 AM   Positive Blood Return (Site #3) Yes 10/3/2017 11:52 AM   Alcohol Cap Used Yes 10/3/2017 11:52 AM        Peripheral IV 10/01/17 Left Antecubital (Active)   Site Assessment Clean, dry, & intact 10/3/2017 11:52 AM   Phlebitis Assessment 0 10/3/2017 11:52 AM   Infiltration Assessment 0 10/3/2017 11:52 AM   Dressing Status Clean, dry, & intact 10/3/2017 11:52 AM   Dressing Type Tape;Transparent 10/3/2017 11:52 AM   Hub Color/Line Status Pink;Capped;Flushed 10/3/2017 11:52 AM   Action Taken Open ports on tubing capped 10/3/2017 11:52 AM   Alcohol Cap Used Yes 10/3/2017 11:52 AM                      Urinary Catheter      Intake & Output   Date 10/02/17 0700 - 10/03/17 0659 10/03/17 0700 - 10/04/17 0659   Shift 4634-1747 1023-1160 24 Hour Total 0519-6845 5003-3583 24 Hour Total   I  N  T  A  K  E   P. O.  600 600         P. O.  600 600       I.V.  (mL/kg/hr)  2809.1  (2.9) 2809.1  (1.5) 476.3  476.3      Levophed Volume  35.3 35.3 0  0      Volume (0.9% sodium chloride infusion)  2323.8 2323.8 376.3  376.3      Volume (cefepime (MAXIPIME) 2 g in 0.9% sodium chloride (MBP/ADV) 100 mL)  300 300 100  100 Volume (levoFLOXacin (LEVAQUIN) 750 mg in D5W IVPB)  150 150 0  0    Shift Total  (mL/kg)  3409.1  (42.7) 3409.1  (42.7) 476.3  (6)  476.3  (6)   O  U  T  P  U  T   Urine  (mL/kg/hr) 775  (0.8) 1450  (1.5) 2225  (1.2) 900  900      Urine Voided 775 1450 2225 900  900    Shift Total  (mL/kg) 775  (9.7) 1450  (18.2) 2225  (27.9) 900  (11.3)  900  (11.3)   NET -775 1959.1 1184.1 -423.8  -423.8   Weight (kg) 79.9 79.8 79.8 79.8 79.8 79.8         Readmission Risk Assessment Tool Score Medium Risk            15       Total Score        3 Has Seen PCP in Last 6 Months (Yes=3, No=0)    4 Pt. Coverage (Medicare=5 , Medicaid, or Self-Pay=4)    8 Charlson Comorbidity Score (Age + Comorbid Conditions)        Criteria that do not apply:    . Living with Significant Other. Assisted Living. LTAC. SNF.  or   Rehab    Patient Length of Stay (>5 days = 3)    IP Visits Last 12 Months (1-3=4, 4=9, >4=11)       Expected Length of Stay 4d 21h   Actual Length of Stay 2

## 2017-10-03 NOTE — PROGRESS NOTES
ADULT PROTOCOL: JET AEROSOL ASSESSMENT    Patient  Maureen Rosales     58 y.o.   male     10/3/2017  8:38 AM    Breath Sounds Pre Procedure: Right Breath Sounds: Clear                               Left Breath Sounds: Clear    Breath Sounds Post Procedure: Right Breath Sounds: Clear                                 Left Breath Sounds: Clear    Breathing pattern: Pre procedure Breathing Pattern: Regular          Post procedure Breathing Pattern: Regular    Heart Rate: Pre procedure Pulse: 52           Post procedure Pulse: 89    Resp Rate: Pre procedure Respirations: 18           Post procedure Respirations: 20            Cough: Pre procedure Cough: Non-productive               Post procedure Cough: Non-productive      Oxygen: O2 Device: Room air       Changed: NO    SpO2: Pre procedure SpO2: 96 %  Room air              Post procedure SpO2: 97 %  Room air    Nebulizer Therapy: Current medications Aerosolized Medications: DuoNeb      Changed: Yes (Q6 Hrs PRN)      Problem List:   Patient Active Problem List   Diagnosis Code    Septic shock (HCC) A41.9, R65.21    Pneumonia J18.9    Chronic pain G89.29    Liver disease K76.9    Psychiatric disorder F99    HTN (hypertension) I10       Respiratory Therapist: Paulo Brandt, RT

## 2017-10-03 NOTE — DISCHARGE INSTRUCTIONS
Diet: Heart Health  Activity: As tolerated    Cbc and Bmp in 1 week. Avoid smoking. Please check your blood pressure daily and take it to PCP. Please come back to ED if you develop diarrhea.

## 2017-10-03 NOTE — PROGRESS NOTES
CM Initial Assessment        Current admission assessment-- Patient came to ed with complaint of epigastric pain with dark tarry stool, nausea, diarrhea, mild sob and decreased appetite. Patient lives in two story home with his sister and brother in law. He states his sister does not work. His brother in law does work. Patient states he was independent in adl's prior to coming to the hospital. He denies using dme at home however he does have it. He gets his prescriptions filled at [facility] on [address]. He states he has used home health in the past however he does not remember the name of the company. He has used [facility] as outpatient and has been to [facility] for outpatient physical therapy. He is being discharged today and states his sister will be picking him up. He will be returning home with his sister and brother in law. Patient denies being dizzy when up. Care Management Interventions  PCP Verified by CM: Yes  Mode of Transport at Discharge:  (Patient is being discharged to home today. )  Discharge Durable Medical Equipment:  (Patient has cane, raised toilet seat, rolling walker and shower chair at home. )  Physical Therapy Consult: Yes  Occupational Therapy Consult: Yes  Current Support Network:  Other (Lives with his sister and brother in law.)  Confirm Follow Up Transport: Family  Plan discussed with Pt/Family/Caregiver: Yes  Discharge Location  Discharge Placement: Home                   Alex [id number] EXT [id number]

## 2017-10-03 NOTE — PROGRESS NOTES
[address] 10/3/2017      RE: Lefty Le      To Whom It May Concern,    This is to certify that Lefty Le was admitted to our facility from 10/1/2017 to 10/3/2017      Please feel free to contact my office if you have any questions or concerns. Thank you for your assistance in this matter. Sincerely,  Jasvir Ruiz MD     [facility].   [phone]

## 2017-10-03 NOTE — PROGRESS NOTES
Problem: Falls - Risk of  Goal: *Absence of Falls  Document Wale Fall Risk and appropriate interventions in the flowsheet.    Outcome: Progressing Towards Goal  Fall Risk Interventions:  Mobility Interventions: Patient to call before getting OOB           Medication Interventions: Patient to call before getting OOB     Elimination Interventions: Call light in reach

## 2017-10-03 NOTE — PROGRESS NOTES
Occupational Therapy Goals  Initiated 10/2/2017  1. Patient will perform grooming standing at sink with independence within 7 day(s). 2.  Patient will perform sponge bathing with independence within 7 day(s). 3.  Patient will perform lower body dressing with independence within 7 day(s). 4.  Patient will perform toilet transfers with modified independence within 7 day(s). 5.  Patient will perform all aspects of toileting with independence within 7 day(s). 6.  Patient will perform ADL setup with independence within 7 day(s). Occupational Therapy TREATMENT/DC  Patient: Debby Patton (89 y.o. male)  Date: 10/3/2017  Diagnosis: Septic shock ([facility] [address] <principal problem not specified>       Precautions: Fall    ASSESSMENT:  Patient able to perform dressing, grooming, toileting, transfers and ambulation without an AD independently. BP stable during standing ADLs and ambulation, with patient having no complaint of dizziness and demonstrating good balance. Goals met based on patient's overall performance today and his without further OT needs. Progression toward goals:  [x]       Improving appropriately and progressing toward goals  []       Improving slowly and progressing toward goals  []       Not making progress toward goals and plan of care will be adjusted     PLAN: Discharge from OT  Discharge Recommendations:  None  Further Equipment Recommendations for Discharge:  none       OBJECTIVE DATA SUMMARY:   Cognitive/Behavioral Status:  Neurologic State: Alert  Orientation Level: Oriented X4  Cognition: Appropriate decision making; Appropriate for age attention/concentration; Appropriate safety awareness; Follows commands        Safety/Judgement: Awareness of environment; Insight into deficits  Functional Mobility and Transfers for ADLs:  Bed Mobility:           Scooting: Independent  Transfers:  Sit to Stand: Independent     Bed to Chair: Independent          Toilet Transfer : Independent           Bathroom Mobility: Independent      Balance:  Sitting: Intact  Standing: Intact  ADL Intervention:       Grooming  Grooming Assistance: Independent (standing at sink)  Washing Face: Independent  Brushing Teeth: Independent  Brushing/Combing Hair: Independent    Upper Body [address]: Independent    Lower Body Dressing Assistance  Pants With Elastic Waist: Independent  Socks: Independent    Toileting  Toileting Assistance: Independent (ambulating without an AD)  Clothing Management: Independent    Cognitive Retraining  Safety/Judgement: Awareness of environment; Insight into deficits    Pain:  Pain Scale 1: Numeric (0 - 10)  Pain Intensity 1: 7  Pain Location 1: Back;Head     Pain Description 1: Aching  Pain Intervention(s) 1: Medication (see MAR)  Activity Tolerance:   /69 s/p 10 minutes of standing activity. Please refer to the flowsheet for vital signs taken during this treatment.   After treatment:   [x] Patient left in no apparent distress sitting up in chair  [] Patient left in no apparent distress in bed  [x] Call bell left within reach  [x] Nursing notified  [] Caregiver present  [] Bed alarm activated    COMMUNICATION/COLLABORATION:   The patients plan of care was discussed with: Registered Nurse and     Amador Eastman, OTR/L  Time Calculation: 15 mins

## 2017-10-04 NOTE — DISCHARGE SUMMARY
Hospitalist Discharge Summary     Patient ID:  Debby Patton  [id number]  51 y.o.  1955    PCP on record: Jimmy Grimaldo MD    Admit date: 10/1/2017  Discharge date and time: 10/3/2017      DISCHARGE DIAGNOSIS:    Septic Shock. RLL Pneumonia  HTN:   Mild elevation of AST  Chest pain: seems to be pleuritic, troponin is negative  GERD: c/w PPI. Chronic Pain  Psychiatric Disorder: seems stable  Few episodes of dark stool at home: No further episodes here. Hb is stable.          CONSULTATIONS:  None    Excerpted HPI from H&P of Kvng Moran MD:  Roddy Blount is a 58 y.o.  male with pmhx significant for HTN, GERD, hepatitis (in the 1970s), and depression/anxiety, who presents ambulatory with wife at [facility] to the ED c/o epigastric pain that radiates up towards his middle chest x 1 week. Pt notes associated dark tarry stools, nausea, diarrhea, mild SOB, decreased appetite, and feeling lightheaded. Per wife, pt also appears pale as well. Pt states that his pain is exacerbated with yawning or taking a deep breath. Pt states that he does have a hx of heavy drinking for a   couple of years, and states that he has not drank alcohol in 22 years. Pt is currently on anticoagulation, Effient, which pt repots compliance with. Pt denies hx of GI bleeds. Pt denies syncope, dizziness, vomiting, or hematemesis. At this time patient is lying in bed c/o chest pain, cough, while seeing in the ER, noted to be hypotensive and received 3L NS but BP still marginal, denies fever, no N/V no diarrhea, no urinary symptoms, no other associated symptoms. ______________________________________________________________________  DISCHARGE SUMMARY/HOSPITAL COURSE:  for full details see H&P, daily progress notes, labs, consult notes. Pt was admitted to the hospital and was treated for following medical problems. Septic Shock.   RLL Pneumonia    Pt was started on sepsis protocol and received iv fluids based on his weight. He was alos started on   Broad spectrum abx including cefepime and Levaquin. Pan cultures were sent including blood and sputum. Pt also briefly required levophed for blood pressure but that wea quickly weaned and he was hemodynamically  Stable at which antibiotics were changed to oral and he was discharged in stable condition for out pt follow up. HTN: BP was low so  ACE was held  for now due to borderline low pressure and he was advised to check  His blood pressure regularly  And take it to his pcp for adjusting medications. Mild elevation of AST   Hepatitis panel positive for Hep  C and an out pt was set up Dr Jag Rodriguez for consideration of treatment for Hep C. Statin was held due to elevated AST and if repeat CMP is normal this can be resumed at the discretion of his PCP. Chest pain: seems to be pleuritic, troponin is negative,  ECHO is normal.     GERD: c/w PPI. Few episodes of dark stool at home: No further episodes here. Hb is stable.            _______________________________________________________________________  Patient seen and examined by me on discharge day. Pertinent Findings:  Gen:    Not in distress  Chest: Clear lungs  CVS:   Regular rhythm. No edema  Abd:  Soft, not distended, not tender  Neuro:  Alert, awake   _______________________________________________________________________  DISCHARGE MEDICATIONS:   Discharge Medication List as of 10/3/2017  2:45 PM      START taking these medications    Details   levoFLOXacin (LEVAQUIN) 750 mg tablet Take 1 Tab by mouth daily for 7 days. , Print, Disp-7 Tab, R-0         CONTINUE these medications which have NOT CHANGED    Details   tamsulosin (FLOMAX) 0.4 mg capsule Take 0.4 mg by mouth daily. , Historical Med      prasugrel (EFFIENT) 10 mg tablet Take 10 mg by mouth daily. , Historical Med      zolpidem (AMBIEN) 10 mg tablet Take 10 mg by mouth nightly as needed for Sleep., Historical Med      ondansetron (ZOFRAN ODT) 4 mg disintegrating tablet Take 1 Tab by mouth every eight (8) hours as needed for Nausea. , Print, Disp-10 Tab, R-0      Omeprazole delayed release (PRILOSEC D/R) 20 mg tablet Take 20 mg by mouth every morning. Historical Med, 20 mg      omega-3 fatty acids-vitamin e (FISH OIL) 1,000 mg Cap Take 1 Cap by mouth two (2) times a day. Historical Med, 1 Cap      cholecalciferol, vitamin d3, (VITAMIN D3) 1,000 unit tablet Take 1,000 Units by mouth daily. Historical Med, 1,000 Units      ALPRAZolam (XANAX) 2 mg tablet Take 2 mg by mouth two (2) times a day. Historical Med, 2 mg         STOP taking these medications       predniSONE (STERAPRED DS) 10 mg dose pack Comments:   Reason for Stopping:         atorvastatin (LIPITOR) 80 mg tablet Comments:   Reason for Stopping:         lisinopril (PRINIVIL, ZESTRIL) 10 mg tablet Comments:   Reason for Stopping:               My Recommended Diet, Activity, Wound Care, and follow-up labs are listed in the patient's Discharge Insturctions which I have personally completed and reviewed. _______________________________________________________________________  DISPOSITION:    Home with Family: y   Home with HH/PT/OT/RN:    SNF/LTC:    CODEY:    OTHER:        Condition at Discharge:  Stable  _______________________________________________________________________  Follow up with:   PCP : Kenrick Diaz MD  Follow-up Information     Follow up With Details Comments [address], [place] Go on 10/16/2017 PCP follow up at 3:00 PM  [id number] [facility]  [phone]      Tanvi Mendez  Go on 10/25/2017 Follow up at 2:15 PM - For Hepatitis C treatment consideration.  [facility]  [address] [facility] [address]  [place], [address] [place] [address]  [phone]              Total time in minutes spent coordinating this discharge (includes going over instructions, follow-up, prescriptions, and preparing report for sign off to her PCP) :  35 minutes    Signed:  Radha Rodriguez MD

## 2017-10-06 LAB
BACTERIA SPEC CULT: NORMAL
SERVICE CMNT-IMP: NORMAL

## 2018-12-11 ENCOUNTER — HOSPITAL ENCOUNTER (EMERGENCY)
Age: 63
Discharge: HOME | End: 2018-12-11
Attending: EMERGENCY MEDICINE
Payer: MEDICAID

## 2018-12-11 ENCOUNTER — APPOINTMENT (OUTPATIENT)
Dept: GENERAL RADIOLOGY | Age: 63
End: 2018-12-11
Attending: PHYSICIAN ASSISTANT
Payer: MEDICAID

## 2018-12-11 VITALS
RESPIRATION RATE: 18 BRPM | HEIGHT: 69 IN | WEIGHT: 185.41 LBS | TEMPERATURE: 97.7 F | OXYGEN SATURATION: 98 % | BODY MASS INDEX: 27.46 KG/M2 | SYSTOLIC BLOOD PRESSURE: 113 MMHG | HEART RATE: 88 BPM | DIASTOLIC BLOOD PRESSURE: 68 MMHG

## 2018-12-11 DIAGNOSIS — S42.411A CLOSED SUPRACONDYLAR FRACTURE OF RIGHT HUMERUS, INITIAL ENCOUNTER: Primary | ICD-10-CM

## 2018-12-11 PROCEDURE — 74011250636 HC RX REV CODE- 250/636: Performed by: PHYSICIAN ASSISTANT

## 2018-12-11 PROCEDURE — 96372 THER/PROPH/DIAG INJ SC/IM: CPT

## 2018-12-11 PROCEDURE — 73030 X-RAY EXAM OF SHOULDER: CPT

## 2018-12-11 PROCEDURE — 99282 EMERGENCY DEPT VISIT SF MDM: CPT

## 2018-12-11 PROCEDURE — A4565 SLINGS: HCPCS

## 2018-12-11 RX ORDER — IBUPROFEN 800 MG/1
800 TABLET ORAL
Qty: 20 TAB | Refills: 0 | Status: SHIPPED | OUTPATIENT
Start: 2018-12-11 | End: 2018-12-18

## 2018-12-11 RX ORDER — FENTANYL CITRATE 50 UG/ML
100 INJECTION, SOLUTION INTRAMUSCULAR; INTRAVENOUS
Status: COMPLETED | OUTPATIENT
Start: 2018-12-11 | End: 2018-12-11

## 2018-12-11 RX ADMIN — FENTANYL CITRATE 100 MCG: 50 INJECTION, SOLUTION INTRAMUSCULAR; INTRAVENOUS at 22:22

## 2018-12-12 NOTE — ED PROVIDER NOTES
EMERGENCY DEPARTMENT HISTORY AND PHYSICAL EXAM      Date: 12/11/2018  Patient Name: Jeanna Koo    History of Presenting Illness     Chief Complaint   Patient presents with    Fall     fell at 1000 this morning, went to [facility] - diagnosed with right humerus fracture    Arm Injury       History Provided By: Patient    HPI: Jeanna Koo, 61 y.o. male with PMHx significant for HTN, arthritis, presents ambulatory to the ED with cc of a R shoulder pain s/p a fall at 10 AM this morning. He denies any associated symptoms. Pt states he had slipped and fallen backwards onto his R shoulder on ice and originally thought he had only bruised the area. He was then seen at [facility], who referred him to the ER for a fracture. Pt presents to the ER with a sling in place on his R arm. He endorses being on chronic pain medication, but denies being in any pain management program. He denies hitting his head. Pt denies seeing an orthopedic specialist in the past. He denies any LOC, numbness, or tingling. He is R hand dominant    There are no other complaints, changes, or physical findings at this time. PCP: Favian Martinez MD    Current Outpatient Medications   Medication Sig Dispense Refill    ibuprofen (MOTRIN) 800 mg tablet Take 1 Tab by mouth every eight (8) hours as needed for Pain for up to 7 days. 20 Tab 0    tamsulosin (FLOMAX) 0.4 mg capsule Take 0.4 mg by mouth daily.  prasugrel (EFFIENT) 10 mg tablet Take 10 mg by mouth daily.  zolpidem (AMBIEN) 10 mg tablet Take 10 mg by mouth nightly as needed for Sleep.  ondansetron (ZOFRAN ODT) 4 mg disintegrating tablet Take 1 Tab by mouth every eight (8) hours as needed for Nausea. 10 Tab 0    Omeprazole delayed release (PRILOSEC D/R) 20 mg tablet Take 20 mg by mouth every morning.  omega-3 fatty acids-vitamin e (FISH OIL) 1,000 mg Cap Take 1 Cap by mouth two (2) times a day.         cholecalciferol, vitamin d3, (VITAMIN D3) 1,000 unit tablet Take 1,000 Units by mouth daily.  ALPRAZolam (XANAX) 2 mg tablet Take 2 mg by mouth two (2) times a day. Past History     Past Medical History:  Past Medical History:   Diagnosis Date    Arthritis     Chronic pain     GERD (gastroesophageal reflux disease)     Hypertension     Liver disease 1970'S    HX HEPATITIS    Nausea & vomiting     Psychiatric disorder     DEPRESSION AND ANXIETY       Past Surgical History:  Past Surgical History:   Procedure Laterality Date    ENDOSCOPY VISIT-OUTPATIENT  2003    HX BACK SURGERY      LUMBAR SPINE X 7       Family History:  Family History   Problem Relation Age of Onset    Cancer Father         BRAIN    Heart Disease Maternal Aunt     Stroke Maternal Aunt     Heart Disease Maternal Uncle     Stroke Maternal Uncle     Cancer Paternal Uncle         PROSTATE    Stroke Maternal Grandmother     Cancer Paternal Grandmother         BREAST    Heart Disease Sister     Heart Disease Sister        Social History:  Social History     Tobacco Use    Smoking status: Current Every Day Smoker     Packs/day: 1.00     Years: 40.00     Pack years: 40.00    Smokeless tobacco: Never Used   Substance Use Topics    Alcohol use: No     Comment: RECOVERING ALCOHOLIC    Drug use: No       Allergies: Allergies   Allergen Reactions    Latex Hives    Adhesive Rash    Codeine Hives     ITCHING, NAUSEA    Morphine Itching     NAUSEA       Review of Systems   Review of Systems   Constitutional: Negative for chills and fever. HENT: Negative for congestion, rhinorrhea and sore throat. Respiratory: Negative for cough and shortness of breath. Cardiovascular: Negative for chest pain. Gastrointestinal: Negative for abdominal pain, nausea and vomiting. Genitourinary: Negative for dysuria and urgency. Musculoskeletal: Positive for arthralgias (+R shoulder). Skin: Negative for rash.    Neurological: Negative for dizziness, syncope, light-headedness, numbness and headaches. All other systems reviewed and are negative. Physical Exam   Physical Exam   Constitutional: He is oriented to person, place, and time. He appears well-developed and well-nourished. No distress. HENT:   Head: Normocephalic and atraumatic. Eyes: Conjunctivae and EOM are normal. Pupils are equal, round, and reactive to light. Neck: Normal range of motion. Cardiovascular: Normal rate, regular rhythm and intact distal pulses. 2+ radial pulse   Pulmonary/Chest: Effort normal and breath sounds normal. No stridor. No respiratory distress. Abdominal: Soft. He exhibits no distension. There is no tenderness. Musculoskeletal: Normal range of motion. Neurological: He is alert and oriented to person, place, and time.  and sensation intact in RUE   Skin: Skin is warm and dry. Ecchymosis noted. Ecchymosis to R upper arm   Psychiatric: He has a normal mood and affect. Nursing note and vitals reviewed. Diagnostic Study Results     Labs - No results found for this or any previous visit (from the past 12 hour(s)). Radiologic Studies -   XR SHOULDER RT AP/LAT MIN 2 V   Final Result   IMPRESSION: Acute, comminuted, mildly displaced fracture of the proximal right   humerus. Medical Decision Making   I am the first provider for this patient. I reviewed the vital signs, available nursing notes, past medical history, past surgical history, family history and social history. Vital Signs-Reviewed the patient's vital signs. Patient Vitals for the past 12 hrs:   Temp Pulse Resp BP SpO2   12/11/18 2123 97.7 °F (36.5 °C) 88 18 113/68 98 %     Records Reviewed: Nursing Notes, Old Medical Records, Previous Radiology Studies and Previous Laboratory Studies    Provider Notes (Medical Decision Making):   DDx: fracture, sprain, strain    ED Course:   Initial assessment performed.  The patients presenting problems have been discussed, and they are in agreement with the care plan formulated and outlined with them. I have encouraged them to ask questions as they arise throughout their visit. PROGRESS NOTE:  10:04 PM  Attempted to evaluate the pt. Pt was in XR. Will return shortly to evaluate the pt. PROGRESS NOTE:  10:53 PM  Genevieveer texting orthopedics. CONSULT NOTE:  10:56 PM  JOSE Obando MD spoke with Dr. Skinny Ledbetter and MATTEO Li,  Specialty: Orthopedics  Discussed patient's hx, disposition, and available diagnostic and imaging results. Reviewed care plans. Consultant agrees with plans as outlined. Dr. Skinny Ledbetter recommended a sling and f/u. PROGRESS NOTE:  11:00 PM   reviewed. The pt had received 150 of hydrocodone on 11/18/18. Will not rx any additional narcotics    Critical Care Time:   0    Disposition:  DISCHARGE NOTE  11:04 PM  The patient has been re-evaluated and is ready for discharge. Reviewed available results with patient. Counseled patient on diagnosis and care plan. Patient has expressed understanding, and all questions have been answered. Patient agrees with plan and agrees to follow up as recommended, or return to the ED if their symptoms worsen. Discharge instructions have been provided and explained to the patient, along with reasons to return to the ED. PLAN:  1. Discharge  Current Discharge Medication List      START taking these medications    Details   ibuprofen (MOTRIN) 800 mg tablet Take 1 Tab by mouth every eight (8) hours as needed for Pain for up to 7 days. Qty: 20 Tab, Refills: 0           2.    Follow-up Information     Follow up With Specialties Details Why Contact G[facility]mez Woodard,  Orthopedic Surgery Schedule an appointment as soon as possible for a visit  [address] [place] [address],  [phone]      [facility] EMERGENCY DEPT Emergency Medicine  As needed, If symptoms worsen [address] [place] [address]  [phone]        Return to ED if worse     Diagnosis     Clinical Impression: 1. Closed supracondylar fracture of right humerus, initial encounter        Attestations: This note is prepared by Sim Phillip, acting as Scribe for JOSE Ashford MD.    Madiha Ashraf. Merary Ashford MD: The scribe's documentation has been prepared under my direction and personally reviewed by me in its entirety. I confirm that the note above accurately reflects all work, treatment, procedures, and medical decision making performed by me. This note will not be viewable in [address]

## 2018-12-12 NOTE — DISCHARGE INSTRUCTIONS
Broken Arm: Care Instructions  Your Care Instructions  Fractures can range from a small, hairline crack, to a bone or bones broken into two or more pieces. Your treatment depends on how bad the break is. Your doctor may have put your arm in a splint or cast to allow it to heal or to keep it stable until you see another doctor. It may take weeks or months for your arm to heal. You can help your arm heal with some care at home. You heal best when you take good care of yourself. Eat a variety of healthy foods, and don't smoke. You may have had a sedative to help you relax. You may be unsteady after having sedation. It can take a few hours for the medicine's effects to wear off. Common side effects of sedation include nausea, vomiting, and feeling sleepy or tired. The doctor has checked you carefully, but problems can develop later. If you notice any problems or new symptoms, get medical treatment right away. Follow-up care is a key part of your treatment and safety. Be sure to make and go to all appointments, and call your doctor if you are having problems. It's also a good idea to know your test results and keep a list of the medicines you take. How can you care for yourself at home? · If the doctor gave you a sedative:  ? For 24 hours, don't do anything that requires attention to detail. It takes time for the medicine's effects to completely wear off.  ? For your safety, do not drive or operate any machinery that could be dangerous. Wait until the medicine wears off and you can think clearly and react easily. · Put ice or a cold pack on your arm for 10 to 20 minutes at a time. Try to do this every 1 to 2 hours for the next 3 days (when you are awake). Put a thin cloth between the ice and your cast or splint. Keep the cast or splint dry. · Follow the cast care instructions your doctor gives you. If you have a splint, do not take it off unless your doctor tells you to. · Be safe with medicines.  Take pain medicines exactly as directed. ? If the doctor gave you a prescription medicine for pain, take it as prescribed. ? If you are not taking a prescription pain medicine, ask your doctor if you can take an over-the-counter medicine. · Prop up your arm on pillows when you sit or lie down in the first few days after the injury. Keep the arm higher than the level of your heart. This will help reduce swelling. · Follow instructions for exercises to keep your arm strong. · Wiggle your fingers and wrist often to reduce swelling and stiffness. When should you call for help? Call 911 anytime you think you may need emergency care. For example, call if:    · You are very sleepy and you have trouble waking up.    Call your doctor now or seek immediate medical care if:    · You have new or worse nausea or vomiting.     · You have new or worse pain.     · Your hand or fingers are cool or pale or change color.     · Your cast or splint feels too tight.     · You have tingling, weakness, or numbness in your hand or fingers.    Watch closely for changes in your health, and be sure to contact your doctor if:    · You do not get better as expected.     · You have problems with your cast or splint. Where can you learn more? Go to http://blayne-hector.info/. Enter L680 in the search box to learn more about \"Broken Arm: Care Instructions. \"  Current as of: November 29, 2017  Content Version: 11.8  © 4985-1245 [employer]. Care instructions adapted under license by [employer] (which disclaims liability or warranty for this information). If you have questions about a medical condition or this instruction, always ask your healthcare professional. [address] any warranty or liability for your use of this information.

## 2018-12-12 NOTE — ED NOTES
Pt reports being referred from [facility] \"emergently and needing emergency surgery because the blood vessels are wrapped around. \"

## 2019-04-09 ENCOUNTER — HOSPITAL ENCOUNTER (EMERGENCY)
Age: 64
Discharge: HOME | End: 2019-04-09
Attending: EMERGENCY MEDICINE
Payer: MEDICAID

## 2019-04-09 ENCOUNTER — APPOINTMENT (OUTPATIENT)
Dept: GENERAL RADIOLOGY | Age: 64
End: 2019-04-09
Attending: EMERGENCY MEDICINE
Payer: MEDICAID

## 2019-04-09 VITALS
HEART RATE: 57 BPM | DIASTOLIC BLOOD PRESSURE: 74 MMHG | WEIGHT: 185.41 LBS | BODY MASS INDEX: 28.1 KG/M2 | HEIGHT: 68 IN | OXYGEN SATURATION: 99 % | SYSTOLIC BLOOD PRESSURE: 116 MMHG | RESPIRATION RATE: 19 BRPM | TEMPERATURE: 97.9 F

## 2019-04-09 DIAGNOSIS — J06.9 ACUTE UPPER RESPIRATORY INFECTION: Primary | ICD-10-CM

## 2019-04-09 DIAGNOSIS — R68.89 FLU-LIKE SYMPTOMS: ICD-10-CM

## 2019-04-09 DIAGNOSIS — Z71.6 ENCOUNTER FOR SMOKING CESSATION COUNSELING: ICD-10-CM

## 2019-04-09 DIAGNOSIS — Z72.0 TOBACCO ABUSE: ICD-10-CM

## 2019-04-09 LAB
FLUAV AG NPH QL IA: NEGATIVE
FLUBV AG NOSE QL IA: NEGATIVE

## 2019-04-09 PROCEDURE — 74011250637 HC RX REV CODE- 250/637: Performed by: EMERGENCY MEDICINE

## 2019-04-09 PROCEDURE — 71046 X-RAY EXAM CHEST 2 VIEWS: CPT

## 2019-04-09 PROCEDURE — 99283 EMERGENCY DEPT VISIT LOW MDM: CPT

## 2019-04-09 PROCEDURE — 93005 ELECTROCARDIOGRAM TRACING: CPT

## 2019-04-09 PROCEDURE — 87804 INFLUENZA ASSAY W/OPTIC: CPT

## 2019-04-09 PROCEDURE — 94640 AIRWAY INHALATION TREATMENT: CPT

## 2019-04-09 RX ORDER — GUAIFENESIN 600 MG/1
600 TABLET, EXTENDED RELEASE ORAL 2 TIMES DAILY
Qty: 20 TAB | Refills: 0 | OUTPATIENT
Start: 2019-04-09 | End: 2021-09-30

## 2019-04-09 RX ORDER — BENZONATATE 100 MG/1
100 CAPSULE ORAL
Qty: 30 CAP | Refills: 0 | Status: SHIPPED | OUTPATIENT
Start: 2019-04-09 | End: 2019-04-09

## 2019-04-09 RX ORDER — BENZONATATE 100 MG/1
100 CAPSULE ORAL
Qty: 30 CAP | Refills: 0 | Status: SHIPPED | OUTPATIENT
Start: 2019-04-09 | End: 2019-04-16

## 2019-04-09 RX ORDER — ALBUTEROL SULFATE 90 UG/1
2 AEROSOL, METERED RESPIRATORY (INHALATION) ONCE
Status: COMPLETED | OUTPATIENT
Start: 2019-04-09 | End: 2019-04-09

## 2019-04-09 RX ORDER — PREDNISONE 50 MG/1
50 TABLET ORAL DAILY
Qty: 5 TAB | Refills: 0 | Status: SHIPPED | OUTPATIENT
Start: 2019-04-09 | End: 2019-04-09

## 2019-04-09 RX ORDER — AZITHROMYCIN 250 MG/1
TABLET, FILM COATED ORAL
Qty: 6 TAB | Refills: 0 | Status: SHIPPED | OUTPATIENT
Start: 2019-04-09 | End: 2019-04-09

## 2019-04-09 RX ORDER — PREDNISONE 50 MG/1
50 TABLET ORAL DAILY
Qty: 5 TAB | Refills: 0 | Status: SHIPPED | OUTPATIENT
Start: 2019-04-09 | End: 2019-04-14

## 2019-04-09 RX ORDER — AZITHROMYCIN 250 MG/1
TABLET, FILM COATED ORAL
Qty: 6 TAB | Refills: 0 | Status: SHIPPED | OUTPATIENT
Start: 2019-04-09 | End: 2019-04-14

## 2019-04-09 RX ORDER — BENZONATATE 100 MG/1
200 CAPSULE ORAL ONCE
Status: COMPLETED | OUTPATIENT
Start: 2019-04-09 | End: 2019-04-09

## 2019-04-09 RX ORDER — GUAIFENESIN 600 MG/1
600 TABLET, EXTENDED RELEASE ORAL 2 TIMES DAILY
Qty: 20 TAB | Refills: 0 | Status: SHIPPED | OUTPATIENT
Start: 2019-04-09 | End: 2019-04-09

## 2019-04-09 RX ADMIN — BENZONATATE 200 MG: 100 CAPSULE ORAL at 16:32

## 2019-04-09 RX ADMIN — ALBUTEROL SULFATE 2 PUFF: 90 AEROSOL, METERED RESPIRATORY (INHALATION) at 16:32

## 2019-04-09 NOTE — ED PROVIDER NOTES
EMERGENCY DEPARTMENT HISTORY AND PHYSICAL EXAM      Date: 4/9/2019  Patient Name: Michael Sidhu  Patient Age and Sex: 61 y.o. male  History of Presenting Illness     Chief Complaint   Patient presents with    Cough     Ambulatory c/o cough sore throat headache and fever        History Provided By: Patient    HPI: Michael Sidhu, 61 y.o. male with PMHx significant for arthritis, chronic pain, GERD, hypertension presents ambulatory to the emergency room with 5 days of productive cough with associated generalized body aches. Patient states the cough is moderate in intensity and worse with smoking. Patient states that he has been cutting back on his smoking however there is no relief of symptoms. He reports a history of bronchitis and feels this is similar. He also reports a bitemporal headache rated at 2 out of 10 in intensity. He also reports a fever of 101 Fahrenheit prior to arrival.  Patient reports taking over-the-counter cold medications with minimal relief of symptoms. He does report having the flu vaccine this year. He denies any other alleviating or exacerbating factors. Additionally, pt specifically denies any recent nausea, vomiting, diarrhea, abdominal pain, CP, SOB, lightheadedness, dizziness, numbness, weakness, tingling, BLE swelling, heart palpitations, urinary sxs, changes in BM, changes in PO intake, melena, hematochezia. PCP: Hailey Kendall MD    There are no other complaints, changes or physical findings at this time.        Past History   Past Medical History:  Past Medical History:   Diagnosis Date    Arthritis     Chronic pain     GERD (gastroesophageal reflux disease)     Hypertension     Liver disease 1970'S    HX HEPATITIS    Nausea & vomiting     Psychiatric disorder     DEPRESSION AND ANXIETY       Past Surgical History:  Past Surgical History:   Procedure Laterality Date    ENDOSCOPY VISIT-OUTPATIENT  2003    HX BACK SURGERY      LUMBAR SPINE X 7       Family History:  Family History   Problem Relation Age of Onset    Cancer Father         BRAIN    Heart Disease Maternal Aunt     Stroke Maternal Aunt     Heart Disease Maternal Uncle     Stroke Maternal Uncle     Cancer Paternal Uncle         PROSTATE    Stroke Maternal Grandmother     Cancer Paternal Grandmother         BREAST    Heart Disease Sister     Heart Disease Sister        Social History:  Social History     Tobacco Use    Smoking status: Current Every Day Smoker     Packs/day: 1.00     Years: 40.00     Pack years: 40.00    Smokeless tobacco: Never Used   Substance Use Topics    Alcohol use: No     Comment: RECOVERING ALCOHOLIC    Drug use: No       Allergies: Allergies   Allergen Reactions    Latex Hives    Adhesive Rash    Codeine Hives     ITCHING, NAUSEA    Morphine Itching     NAUSEA       Medications:  No current facility-administered medications on file prior to encounter. Current Outpatient Medications on File Prior to Encounter   Medication Sig Dispense Refill    tamsulosin (FLOMAX) 0.4 mg capsule Take 0.4 mg by mouth daily.  prasugrel (EFFIENT) 10 mg tablet Take 10 mg by mouth daily.  zolpidem (AMBIEN) 10 mg tablet Take 10 mg by mouth nightly as needed for Sleep.  ondansetron (ZOFRAN ODT) 4 mg disintegrating tablet Take 1 Tab by mouth every eight (8) hours as needed for Nausea. 10 Tab 0    Omeprazole delayed release (PRILOSEC D/R) 20 mg tablet Take 20 mg by mouth every morning.  omega-3 fatty acids-vitamin e (FISH OIL) 1,000 mg Cap Take 1 Cap by mouth two (2) times a day.  cholecalciferol, vitamin d3, (VITAMIN D3) 1,000 unit tablet Take 1,000 Units by mouth daily.  ALPRAZolam (XANAX) 2 mg tablet Take 2 mg by mouth two (2) times a day. Review of Systems   Review of Systems   Constitutional: Negative. Negative for chills and fever. HENT: Positive for congestion.  Negative for facial swelling, rhinorrhea, sore throat, trouble swallowing and voice change. Eyes: Negative. Respiratory: Positive for cough. Negative for apnea, chest tightness, shortness of breath and wheezing. Cardiovascular: Negative. Negative for chest pain, palpitations and leg swelling. Gastrointestinal: Negative. Negative for abdominal distention, abdominal pain, blood in stool, constipation, diarrhea, nausea and vomiting. Endocrine: Negative. Negative for cold intolerance, heat intolerance and polyuria. Genitourinary: Negative. Negative for difficulty urinating, dysuria, flank pain, frequency, hematuria and urgency. Musculoskeletal: Negative. Negative for arthralgias, back pain, myalgias, neck pain and neck stiffness. Skin: Negative. Negative for color change and rash. Neurological: Positive for headaches. Negative for dizziness, syncope, facial asymmetry, speech difficulty, weakness, light-headedness and numbness. Hematological: Negative. Does not bruise/bleed easily. Psychiatric/Behavioral: Negative. Negative for confusion and self-injury. The patient is not nervous/anxious. Physical Exam   Physical Exam   Constitutional: He is oriented to person, place, and time. Vital signs are normal. He appears well-developed and well-nourished. He is cooperative. Non-toxic appearance. HENT:   Head: Normocephalic and atraumatic. Mouth/Throat: Mucous membranes are normal. No posterior oropharyngeal erythema. Eyes: Pupils are equal, round, and reactive to light. Conjunctivae and EOM are normal.   Neck: Normal range of motion. Cardiovascular: Normal rate, regular rhythm, normal heart sounds and intact distal pulses. Exam reveals no gallop and no friction rub. No murmur heard. Pulmonary/Chest: Effort normal and breath sounds normal. No respiratory distress. He has no wheezes. He has no rales. He exhibits no tenderness. Dry cough on exam   Abdominal: Soft. Bowel sounds are normal. He exhibits no distension and no mass.  There is no tenderness. There is no rebound and no guarding. Musculoskeletal: Normal range of motion. He exhibits no edema, tenderness or deformity. Neurological: He is alert and oriented to person, place, and time. He displays normal reflexes. No cranial nerve deficit. He exhibits normal muscle tone. Coordination normal.   Skin: Skin is warm. No rash noted. Psychiatric: He has a normal mood and affect. Nursing note and vitals reviewed. Diagnostic Study Results     Labs -  Recent Results (from the past 24 hour(s))   INFLUENZA A & B AG (RAPID TEST)    Collection Time: 04/09/19  2:54 PM   Result Value Ref Range    Influenza A Antigen NEGATIVE  NEG      Influenza B Antigen NEGATIVE  NEG     EKG, 12 LEAD, INITIAL    Collection Time: 04/09/19  4:26 PM   Result Value Ref Range    Ventricular Rate 53 BPM    Atrial Rate 53 BPM    P-R Interval 152 ms    QRS Duration 84 ms    Q-T Interval 418 ms    QTC Calculation (Bezet) 392 ms    Calculated P Axis 83 degrees    Calculated R Axis 46 degrees    Calculated T Axis 51 degrees    Diagnosis       Sinus bradycardia  When compared with ECG of 01-OCT-2017 13:08,  Criteria for Inferior infarct are no longer present  T wave inversion no longer evident in Inferior leads         Radiologic Studies -   XR CHEST PA LAT   Final Result   Impression: No acute process. No pneumonia           CT Results  (Last 48 hours)    None        CXR Results  (Last 48 hours)               04/09/19 1544  XR CHEST PA LAT Final result    Impression:  Impression: No acute process. No pneumonia           Narrative:  Exam:  2 view chest       Indication: Cough. COMPARISON: 10/1/2017       PA and lateral views demonstrate normal heart size. Coronary stent is noted. There is no acute process in the lung fields. The osseous structures are   unremarkable. Patient status post lower thoracic and upper lumbar surgery                 Medical Decision Making   I am the first provider for this patient.     I reviewed the vital signs, available nursing notes, past medical history, past surgical history, family history and social history. Vital Signs-Reviewed the patient's vital signs. Patient Vitals for the past 24 hrs:   Temp Pulse Resp BP SpO2   04/09/19 1624 -- (!) 57 19 116/74 99 %   04/09/19 1451 97.9 °F (36.6 °C) 60 18 (!) 116/93 99 %       Pulse Oximetry Analysis - 99% on RA    Cardiac Monitor:   Rate: 60 bpm  Rhythm: Normal Sinus Rhythm      ED EKG interpretation:  Rhythm: sinus bradycardia; and regular . Rate (approx.): 53; Axis: normal; P wave: normal; QRS interval: normal ; ST/T wave: normal; Other findings: normal. This EKG was interpreted by Nati Mcallister M.D. Records Reviewed: Nursing Notes, Old Medical Records, Previous electrocardiograms, Previous Radiology Studies and Previous Laboratory Studies    Provider Notes (Medical Decision Making):   Pt presents with flu like symptoms including nasal congestion, rhinorrhea and sore throat. Pt also has non-productive cough without dyspnea or wheezing. Symptoms are consistent with an uncomplicated URI. DDx: bacterial sinusitis vs. pharyngitis, migraine, flu. Symptomatic therapy suggested: acetaminophen, ibuprofen, antihistamine-decongestant of choice, cough suppressant of choice. Increase fluids, use vaporizer, stay in steamy bathroom tid 15 min prn severe cough, tylenol as needed, rest, avoid smoky areas. Lack of antibiotic effectiveness discussed with him. Symptomatic therapy suggested: gargle for sore throat, use mist at bedside for congestion. Apply facial warm packs for sinus pain or use nasal saline sprays. Follow up prn if not better in 72 hours. ED Course:   Initial assessment performed. The patients presenting problems have been discussed, and they are in agreement with the care plan formulated and outlined with them. I have encouraged them to ask questions as they arise throughout their visit.     TOBACCO COUNSELING:   Upon evaluation, pt expressed that they are a current tobacco user. For approximately 10 minutes, pt has been counseled on the dangers of smoking and was encouraged to quit as soon as possible in order to decrease further risks to their health. Pt has conveyed their understanding of the risks involved should they continue to use tobacco products. HYPERTENSION COUNSELING   Education was provided to the patient today regarding their hypertension. Patient is made aware of their elevated blood pressure and is instructed to follow up this week with their Primary Care for a recheck. Patient is counseled regarding consequences of chronic, uncontrolled hypertension including kidney disease, heart disease, stroke or even death. Patient states their understanding and agrees to follow up this week. Additionally, during their visit, I discussed sodium restriction, maintaining ideal body weight and regular exercise program as physiologic means to achieve blood pressure control. The patient will strive towards this. I reviewed our electronic medical record system for any past medical records that were available that may contribute to the patient's current condition, the nursing notes and vital signs from today's visit. Jed Nieves MD    Medications Administered During ED Course:  Medications   benzonatate (TESSALON) capsule 200 mg (200 mg Oral Given 4/9/19 1632)   albuterol (PROVENTIL HFA, VENTOLIN HFA, PROAIR HFA) inhaler 2 Puff (2 Puffs Inhalation Given 4/9/19 1632)     Progress Note:  Patient has been reassessed and reports feeling better and symptoms have improved after ED treatment. Franko Cifuentes is able to tolerate PO and ambulate per baseline. Jordan Pollock's final labs and imaging have been reviewed with him. He has been counseled regarding his diagnosis.  He verbally conveys understanding and agreement of the signs, symptoms, diagnosis, treatment and prognosis and additionally agrees to follow up as recommended with Dr. Angela Streeter MD RIGOBERTO in 24 - 48 hours. He also agrees with the care-plan and conveys that all of his questions have been answered. I have also put together some discharge instructions for him that include: 1) educational information regarding their diagnosis, 2) how to care for their diagnosis at home, as well a 3) list of reasons why they would want to return to the ED prior to their follow-up appointment, should their condition change. I have answered all questions to the patient's satisfaction. Strict return precautions given. He both understood and agreed with plan as discussed above. Vital signs stable for discharge. Disposition: DISCHARGE     The pt is ready for discharge. The pt's signs, symptoms, diagnosis, and discharge instructions have been discussed and pt has conveyed their understanding. The pt is to follow up as recommended or return to ER should their symptoms worsen. Plan has been discussed and pt is in agreement. PLAN:  1. Discharge Medication List as of 4/9/2019  4:35 PM      CONTINUE these medications which have NOT CHANGED    Details   tamsulosin (FLOMAX) 0.4 mg capsule Take 0.4 mg by mouth daily. , Historical Med      prasugrel (EFFIENT) 10 mg tablet Take 10 mg by mouth daily. , Historical Med      zolpidem (AMBIEN) 10 mg tablet Take 10 mg by mouth nightly as needed for Sleep., Historical Med      ondansetron (ZOFRAN ODT) 4 mg disintegrating tablet Take 1 Tab by mouth every eight (8) hours as needed for Nausea. , Print, Disp-10 Tab, R-0      Omeprazole delayed release (PRILOSEC D/R) 20 mg tablet Take 20 mg by mouth every morning. Historical Med, 20 mg      omega-3 fatty acids-vitamin e (FISH OIL) 1,000 mg Cap Take 1 Cap by mouth two (2) times a day. Historical Med, 1 Cap      cholecalciferol, vitamin d3, (VITAMIN D3) 1,000 unit tablet Take 1,000 Units by mouth daily. Historical Med, 1,000 Units      ALPRAZolam (XANAX) 2 mg tablet Take 2 mg by mouth two (2) times a day.   Historical Med, 2 mg 2.   Follow-up Information     Follow up With Specialties Details Why Contact Info    Chloe Wadsworth MD [facility]   [address]  [phone]      [facility] EMERGENCY DEPT Emergency Medicine  As needed, If symptoms worsen [address]  [address] [facility]  [phone]          Return to ED if worse    Diagnosis     Clinical Impression:   1. Acute upper respiratory infection    2. Encounter for smoking cessation counseling    3. Tobacco abuse    4. Flu-like symptoms        Attestation:    I personally performed the services described in this documentation on this date 4/9/2019 for patient Debby Patton. I have reviewed and verified that the information is accurate and complete. Yue Caldwell MD    Please note that this dictation was completed with [employer], the computer voice recognition software. Quite often unanticipated grammatical, syntax, homophones, and other interpretive errors are inadvertently transcribed by the computer software. Please disregard these errors. Please excuse any errors that have escaped final proofreading. Thank you. This note will not be viewable in [address]

## 2019-04-09 NOTE — DISCHARGE INSTRUCTIONS
Thank you for allowing us to take care of you today! We hope we addressed all of your concerns and needs. We strive to provide excellent quality care in the Emergency Department. You will receive a survey after your visit to evaluate the care you were provided. Should you receive a survey from us, we invite you to share your experience and tell us what made it excellent. It was a pleasure serving you, we invite you to share your experience with us, in our pursuit for excellence, should you be selected to receive a survey. The exam and treatment you received in the Emergency Department were for an urgent problem and are not intended as complete care. It is important that you follow up with a doctor, nurse practitioner, or physician assistant for ongoing care. If your symptoms become worse or you do not improve as expected and you are unable to reach your usual health care provider, you should return to the Emergency Department. We are available 24 hours a day. Please take your discharge instructions with you when you go to your follow-up appointment. If you have any problem arranging a follow-up appointment, contact the Emergency Department immediately. If a prescription has been provided, please have it filled as soon as possible to prevent a delay in treatment. Read the entire medication instruction sheet provided to you by the pharmacy. If you have any questions or reservations about taking the medication due to side effects or interactions with other medications, please call your primary care physician or contact the ER to speak with the charge nurse. Make an appointment with your family doctor or the physician you were referred to for follow-up of this visit as instructed on your discharge paperwork, as this is mandatory follow-up. Return to the ER if you are unable to be seen or if you are unable to be seen in a timely manner.     If you have any problem arranging the follow-up visit, contact the Emergency Department immediately. I hope you feel better and thank you again for allow us to provide you with excellent care today at [facility]! Warmest regards,    Claudio Alcala MD  Emergency Medicine Physician  [facility]              Patient Education        Cough: Care Instructions  Your Care Instructions    A cough is your body's response to something that bothers your throat or airways. Many things can cause a cough. You might cough because of a cold or the flu, bronchitis, or asthma. Smoking, postnasal drip, allergies, and stomach acid that backs up into your throat also can cause coughs. A cough is a symptom, not a disease. Most coughs stop when the cause, such as a cold, goes away. You can take a few steps at home to cough less and feel better. Follow-up care is a key part of your treatment and safety. Be sure to make and go to all appointments, and call your doctor if you are having problems. It's also a good idea to know your test results and keep a list of the medicines you take. How can you care for yourself at home? · Drink lots of water and other fluids. This helps thin the mucus and soothes a dry or sore throat. Honey or lemon juice in hot water or tea may ease a dry cough. · Take cough medicine as directed by your doctor. · Prop up your head on pillows to help you breathe and ease a dry cough. · Try cough drops to soothe a dry or sore throat. Cough drops don't stop a cough. Medicine-flavored cough drops are no better than candy-flavored drops or hard candy. · Do not smoke. Avoid secondhand smoke. If you need help quitting, talk to your doctor about stop-smoking programs and medicines. These can increase your chances of quitting for good. When should you call for help? Call 911 anytime you think you may need emergency care.  For example, call if:    · You have severe trouble breathing.    Call your doctor now or seek immediate medical care if:    · You cough up blood.     · You have new or worse trouble breathing.     · You have a new or higher fever.     · You have a new rash.    Watch closely for changes in your health, and be sure to contact your doctor if:    · You cough more deeply or more often, especially if you notice more mucus or a change in the color of your mucus.     · You have new symptoms, such as a sore throat, an earache, or sinus pain.     · You do not get better as expected. Where can you learn more? Go to http://blayne-hector.info/. Enter D279 in the search box to learn more about \"Cough: Care Instructions. \"  Current as of: September 5, 2018  Content Version: 11.9  © 6716-5316 [employer]. Care instructions adapted under license by [employer] (which disclaims liability or warranty for this information). If you have questions about a medical condition or this instruction, always ask your healthcare professional. [address] any warranty or liability for your use of this information.

## 2019-04-10 LAB
ATRIAL RATE: 53 BPM
CALCULATED P AXIS, ECG09: 83 DEGREES
CALCULATED R AXIS, ECG10: 46 DEGREES
CALCULATED T AXIS, ECG11: 51 DEGREES
DIAGNOSIS, 93000: NORMAL
P-R INTERVAL, ECG05: 152 MS
Q-T INTERVAL, ECG07: 418 MS
QRS DURATION, ECG06: 84 MS
QTC CALCULATION (BEZET), ECG08: 392 MS
VENTRICULAR RATE, ECG03: 53 BPM

## 2019-07-30 ENCOUNTER — HOSPITAL ENCOUNTER (EMERGENCY)
Age: 64
Discharge: HOME | End: 2019-07-31
Attending: EMERGENCY MEDICINE
Payer: MEDICAID

## 2019-07-30 DIAGNOSIS — L03.113 CELLULITIS OF RIGHT UPPER EXTREMITY: Primary | ICD-10-CM

## 2019-07-30 LAB
ALBUMIN SERPL-MCNC: 3.7 G/DL (ref 3.5–5)
ALBUMIN/GLOB SERPL: 0.9 {RATIO} (ref 1.1–2.2)
ALP SERPL-CCNC: 116 U/L (ref 45–117)
ALT SERPL-CCNC: 28 U/L (ref 12–78)
ANION GAP SERPL CALC-SCNC: 2 MMOL/L (ref 5–15)
AST SERPL-CCNC: 24 U/L (ref 15–37)
BASOPHILS # BLD: 0 K/UL (ref 0–0.1)
BASOPHILS NFR BLD: 0 % (ref 0–1)
BILIRUB SERPL-MCNC: 1.1 MG/DL (ref 0.2–1)
BUN SERPL-MCNC: 16 MG/DL (ref 6–20)
BUN/CREAT SERPL: 18 (ref 12–20)
CALCIUM SERPL-MCNC: 9.1 MG/DL (ref 8.5–10.1)
CHLORIDE SERPL-SCNC: 101 MMOL/L (ref 97–108)
CO2 SERPL-SCNC: 33 MMOL/L (ref 21–32)
CREAT SERPL-MCNC: 0.9 MG/DL (ref 0.7–1.3)
DIFFERENTIAL METHOD BLD: ABNORMAL
EOSINOPHIL # BLD: 0.1 K/UL (ref 0–0.4)
EOSINOPHIL NFR BLD: 1 % (ref 0–7)
ERYTHROCYTE [DISTWIDTH] IN BLOOD BY AUTOMATED COUNT: 13.6 % (ref 11.5–14.5)
GLOBULIN SER CALC-MCNC: 4.3 G/DL (ref 2–4)
GLUCOSE SERPL-MCNC: 79 MG/DL (ref 65–100)
HCT VFR BLD AUTO: 49.5 % (ref 36.6–50.3)
HGB BLD-MCNC: 16.9 G/DL (ref 12.1–17)
IMM GRANULOCYTES # BLD AUTO: 0 K/UL (ref 0–0.04)
IMM GRANULOCYTES NFR BLD AUTO: 0 % (ref 0–0.5)
LYMPHOCYTES # BLD: 1.1 K/UL (ref 0.8–3.5)
LYMPHOCYTES NFR BLD: 15 % (ref 12–49)
MCH RBC QN AUTO: 31.9 PG (ref 26–34)
MCHC RBC AUTO-ENTMCNC: 34.1 G/DL (ref 30–36.5)
MCV RBC AUTO: 93.6 FL (ref 80–99)
MONOCYTES # BLD: 1 K/UL (ref 0–1)
MONOCYTES NFR BLD: 14 % (ref 5–13)
NEUTS SEG # BLD: 5.1 K/UL (ref 1.8–8)
NEUTS SEG NFR BLD: 70 % (ref 32–75)
NRBC # BLD: 0 K/UL (ref 0–0.01)
NRBC BLD-RTO: 0 PER 100 WBC
PLATELET # BLD AUTO: 89 K/UL (ref 150–400)
PMV BLD AUTO: 11.2 FL (ref 8.9–12.9)
POTASSIUM SERPL-SCNC: 4.8 MMOL/L (ref 3.5–5.1)
PROT SERPL-MCNC: 8 G/DL (ref 6.4–8.2)
RBC # BLD AUTO: 5.29 M/UL (ref 4.1–5.7)
SODIUM SERPL-SCNC: 136 MMOL/L (ref 136–145)
WBC # BLD AUTO: 7.3 K/UL (ref 4.1–11.1)

## 2019-07-30 PROCEDURE — 36415 COLL VENOUS BLD VENIPUNCTURE: CPT

## 2019-07-30 PROCEDURE — 96375 TX/PRO/DX INJ NEW DRUG ADDON: CPT

## 2019-07-30 PROCEDURE — 87040 BLOOD CULTURE FOR BACTERIA: CPT

## 2019-07-30 PROCEDURE — 99283 EMERGENCY DEPT VISIT LOW MDM: CPT

## 2019-07-30 PROCEDURE — 96365 THER/PROPH/DIAG IV INF INIT: CPT

## 2019-07-30 PROCEDURE — 85025 COMPLETE CBC W/AUTO DIFF WBC: CPT

## 2019-07-30 PROCEDURE — 80053 COMPREHEN METABOLIC PANEL: CPT

## 2019-07-31 VITALS
BODY MASS INDEX: 25.37 KG/M2 | RESPIRATION RATE: 17 BRPM | HEIGHT: 69 IN | SYSTOLIC BLOOD PRESSURE: 119 MMHG | WEIGHT: 171.3 LBS | DIASTOLIC BLOOD PRESSURE: 78 MMHG | TEMPERATURE: 99 F | OXYGEN SATURATION: 100 % | HEART RATE: 68 BPM

## 2019-07-31 PROCEDURE — 96375 TX/PRO/DX INJ NEW DRUG ADDON: CPT

## 2019-07-31 PROCEDURE — 74011250636 HC RX REV CODE- 250/636

## 2019-07-31 PROCEDURE — 74011250637 HC RX REV CODE- 250/637: Performed by: EMERGENCY MEDICINE

## 2019-07-31 PROCEDURE — 74011250636 HC RX REV CODE- 250/636: Performed by: EMERGENCY MEDICINE

## 2019-07-31 PROCEDURE — 74011000258 HC RX REV CODE- 258: Performed by: EMERGENCY MEDICINE

## 2019-07-31 PROCEDURE — 96365 THER/PROPH/DIAG IV INF INIT: CPT

## 2019-07-31 RX ORDER — CEPHALEXIN 500 MG/1
500 CAPSULE ORAL 4 TIMES DAILY
Qty: 28 CAP | Refills: 0 | Status: SHIPPED | OUTPATIENT
Start: 2019-07-31 | End: 2019-08-07

## 2019-07-31 RX ORDER — TRAMADOL HYDROCHLORIDE 50 MG/1
50 TABLET ORAL
Qty: 12 TAB | Refills: 0 | Status: SHIPPED | OUTPATIENT
Start: 2019-07-31 | End: 2019-08-03

## 2019-07-31 RX ORDER — SULFAMETHOXAZOLE AND TRIMETHOPRIM 800; 160 MG/1; MG/1
1 TABLET ORAL 2 TIMES DAILY
Qty: 14 TAB | Refills: 0 | Status: SHIPPED | OUTPATIENT
Start: 2019-07-31 | End: 2019-08-07

## 2019-07-31 RX ORDER — SULFAMETHOXAZOLE AND TRIMETHOPRIM 800; 160 MG/1; MG/1
1 TABLET ORAL
Status: COMPLETED | OUTPATIENT
Start: 2019-07-31 | End: 2019-07-31

## 2019-07-31 RX ORDER — ONDANSETRON 2 MG/ML
4 INJECTION INTRAMUSCULAR; INTRAVENOUS
Status: COMPLETED | OUTPATIENT
Start: 2019-07-31 | End: 2019-07-31

## 2019-07-31 RX ORDER — ONDANSETRON 2 MG/ML
INJECTION INTRAMUSCULAR; INTRAVENOUS
Status: COMPLETED
Start: 2019-07-31 | End: 2019-07-31

## 2019-07-31 RX ADMIN — ONDANSETRON 4 MG: 2 INJECTION INTRAMUSCULAR; INTRAVENOUS at 03:17

## 2019-07-31 RX ADMIN — SULFAMETHOXAZOLE AND TRIMETHOPRIM 1 TABLET: 800; 160 TABLET ORAL at 00:43

## 2019-07-31 RX ADMIN — AMPICILLIN SODIUM AND SULBACTAM SODIUM 3 G: 2; 1 INJECTION, POWDER, FOR SOLUTION INTRAMUSCULAR; INTRAVENOUS at 00:43

## 2019-07-31 NOTE — ED PROVIDER NOTES
EMERGENCY DEPARTMENT HISTORY AND PHYSICAL EXAM      Date: 7/30/2019  Patient Name: Jeremy Rubi    History of Presenting Illness     Chief Complaint   Patient presents with    Arm Pain     reports that he woke up Sunday morning with a small patch of redness to R wrist; reports that redness has spread up his R arm and now presents with streaking, was referred to ED by [facility] for cellulitis       History Provided By: Patient    HPI: Jeremy Rubi, 59 y.o. male with PMHx significant for coronary artery disease presents to the emergency room with chief complaint of right arm pain and swelling. Patient states he awoke on Saturday morning (about 3 days ago) with mild redness and swelling of his anterior right wrist.  He thought he had been bitten by an insect. His symptoms seem to resolve over the next 2 days, but this morning he woke up and he had redness swelling and pain involving his right hand right forearm and streaking up into his right upper arm. He has had subjective fevers and chills at home but has not taken his temperature. He denies history of diabetes. He  does not remember when his last tetanus shot was. [facility], and they diagnosed him with possible cellulitis and directed him to the emergency room for further treatment. PCP: Isadora Bell MD    No current facility-administered medications on file prior to encounter. Current Outpatient Medications on File Prior to Encounter   Medication Sig Dispense Refill    guaiFENesin ER (MUCINEX) 600 mg ER tablet Take 1 Tab by mouth two (2) times a day. 20 Tab 0    tamsulosin (FLOMAX) 0.4 mg capsule Take 0.4 mg by mouth daily.  prasugrel (EFFIENT) 10 mg tablet Take 10 mg by mouth daily.  zolpidem (AMBIEN) 10 mg tablet Take 10 mg by mouth nightly as needed for Sleep.  ondansetron (ZOFRAN ODT) 4 mg disintegrating tablet Take 1 Tab by mouth every eight (8) hours as needed for Nausea.  10 Tab 0  Omeprazole delayed release (PRILOSEC D/R) 20 mg tablet Take 20 mg by mouth every morning.  omega-3 fatty acids-vitamin e (FISH OIL) 1,000 mg Cap Take 1 Cap by mouth two (2) times a day.  cholecalciferol, vitamin d3, (VITAMIN D3) 1,000 unit tablet Take 1,000 Units by mouth daily.  ALPRAZolam (XANAX) 2 mg tablet Take 2 mg by mouth two (2) times a day. Past History     Past Medical History:  Past Medical History:   Diagnosis Date    Arthritis     Chronic pain     GERD (gastroesophageal reflux disease)     Hypertension     Liver disease 1970'S    HX HEPATITIS    Nausea & vomiting     Psychiatric disorder     DEPRESSION AND ANXIETY       Past Surgical History:  Past Surgical History:   Procedure Laterality Date    ENDOSCOPY VISIT-OUTPATIENT  2003    HX BACK SURGERY      LUMBAR SPINE X 7       Family History:  Family History   Problem Relation Age of Onset    Cancer Father         BRAIN    Heart Disease Maternal Aunt     Stroke Maternal Aunt     Heart Disease Maternal Uncle     Stroke Maternal Uncle     Cancer Paternal Uncle         PROSTATE    Stroke Maternal Grandmother     Cancer Paternal Grandmother         BREAST    Heart Disease Sister     Heart Disease Sister        Social History:  Social History     Tobacco Use    Smoking status: Current Every Day Smoker     Packs/day: 1.00     Years: 40.00     Pack years: 40.00    Smokeless tobacco: Never Used   Substance Use Topics    Alcohol use: No     Comment: RECOVERING ALCOHOLIC    Drug use: No       Allergies: Allergies   Allergen Reactions    Latex Hives    Adhesive Rash    Codeine Hives     ITCHING, NAUSEA    Morphine Itching     NAUSEA         Review of Systems   Review of Systems   Constitutional: Positive for chills. HENT: Negative for congestion, ear pain, rhinorrhea, sinus pain and sore throat. Eyes: Negative. Respiratory: Negative for cough, chest tightness, shortness of breath and wheezing. Cardiovascular: Negative for chest pain, palpitations and leg swelling. Gastrointestinal: Negative for abdominal pain, constipation, diarrhea, nausea and vomiting. Genitourinary: Negative for dysuria, flank pain and hematuria. Musculoskeletal: Positive for back pain. Negative for myalgias. Skin: Positive for rash. Color change:  Erythema right forearm with streaking to right upper arm. Neurological: Negative for dizziness, syncope, weakness, light-headedness and headaches. Psychiatric/Behavioral: Negative for confusion. The patient is not nervous/anxious. Physical Exam    General appearance - well nourished, well appearing, and in no distress  Eyes - pupils equal and reactive, extraocular eye movements intact  ENT - mucous membranes moist, pharynx normal without lesions  Neck - supple, no significant adenopathy; non-tender to palpation  Chest - clear to auscultation, no wheezes, rales or rhonchi; non-tender to palpation  Heart - normal rate and regular rhythm, S1 and S2 normal, no murmurs noted  Abdomen - soft, nontender, nondistended, no masses or organomegaly  Musculoskeletal - no joint tenderness, deformity or swelling; normal ROM  Extremities - peripheral pulses normal, no pedal edema  Skin - normal coloration and turgor, erythema, warmth, tenderness, and swelling involving the palmar and dorsal aspect of the right hand and anterior right wrist  and forearm with streaking proximally to the medial right upper arm   neurological - alert, oriented x3, normal speech, no focal findings or movement disorder noted    Diagnostic Study Results     Labs -     Recent Results (from the past 12 hour(s))   CBC WITH AUTOMATED DIFF    Collection Time: 07/30/19  9:57 PM   Result Value Ref Range    WBC 7.3 4.1 - 11.1 K/uL    RBC 5.29 4. 10 - 5.70 M/uL    HGB 16.9 12.1 - 17.0 g/dL    HCT 49.5 36.6 - 50.3 %    MCV 93.6 80.0 - 99.0 FL    MCH 31.9 26.0 - 34.0 PG    MCHC 34.1 30.0 - 36.5 g/dL    RDW 13.6 11.5 - 14.5 %    PLATELET 89 (L) 716 - 400 K/uL    MPV 11.2 8.9 - 12.9 FL    NRBC 0.0 0  WBC    ABSOLUTE NRBC 0.00 0.00 - 0.01 K/uL    NEUTROPHILS 70 32 - 75 %    LYMPHOCYTES 15 12 - 49 %    MONOCYTES 14 (H) 5 - 13 %    EOSINOPHILS 1 0 - 7 %    BASOPHILS 0 0 - 1 %    IMMATURE GRANULOCYTES 0 0.0 - 0.5 %    ABS. NEUTROPHILS 5.1 1.8 - 8.0 K/UL    ABS. LYMPHOCYTES 1.1 0.8 - 3.5 K/UL    ABS. MONOCYTES 1.0 0.0 - 1.0 K/UL    ABS. EOSINOPHILS 0.1 0.0 - 0.4 K/UL    ABS. BASOPHILS 0.0 0.0 - 0.1 K/UL    ABS. IMM. GRANS. 0.0 0.00 - 0.04 K/UL    DF AUTOMATED     METABOLIC PANEL, COMPREHENSIVE    Collection Time: 07/30/19  9:57 PM   Result Value Ref Range    Sodium 136 136 - 145 mmol/L    Potassium 4.8 3.5 - 5.1 mmol/L    Chloride 101 97 - 108 mmol/L    CO2 33 (H) 21 - 32 mmol/L    Anion gap 2 (L) 5 - 15 mmol/L    Glucose 79 65 - 100 mg/dL    BUN 16 6 - 20 MG/DL    Creatinine 0.90 0.70 - 1.30 MG/DL    BUN/Creatinine ratio 18 12 - 20      GFR est AA >60 >60 ml/min/1.73m2    GFR est non-AA >60 >60 ml/min/1.73m2    Calcium 9.1 8.5 - 10.1 MG/DL    Bilirubin, total 1.1 (H) 0.2 - 1.0 MG/DL    ALT (SGPT) 28 12 - 78 U/L    AST (SGOT) 24 15 - 37 U/L    Alk. phosphatase 116 45 - 117 U/L    Protein, total 8.0 6.4 - 8.2 g/dL    Albumin 3.7 3.5 - 5.0 g/dL    Globulin 4.3 (H) 2.0 - 4.0 g/dL    A-G Ratio 0.9 (L) 1.1 - 2.2         Radiologic Studies -   No orders to display     CT Results  (Last 48 hours)    None        CXR Results  (Last 48 hours)    None            Medical Decision Making   I am the first provider for this patient. I reviewed the vital signs, available nursing notes, past medical history, past surgical history, family history and social history. Vital Signs-Reviewed the patient's vital signs.   Patient Vitals for the past 12 hrs:   Temp Pulse Resp BP SpO2   07/30/19 2105 97.7 °F (36.5 °C) (!) 59 16 118/86 100 %           Records Reviewed: Nursing Notes and Old Medical Records    Provider Notes (Medical Decision Making): Differential diagnosis: Cellulitis, insect bite, allergic reaction    ED Course:   Initial assessment performed. The patients presenting problems have been discussed, and they are in agreement with the care plan formulated and outlined with them. I have encouraged them to ask questions as they arise throughout their visit. Progress Notes:   Patient with normal white count. Will start on p.o. Bactrim and Keflex and give specific instructions to return to ER if worsening symptoms despite antibiotics. Disposition:  Discharge home    PLAN:  1. Discharge Medication List as of 7/31/2019  4:00 AM      START taking these medications    Details   trimethoprim-sulfamethoxazole (BACTRIM DS) 160-800 mg per tablet Take 1 Tab by mouth two (2) times a day for 7 days. , Print, Disp-14 Tab, R-0      cephALEXin (KEFLEX) 500 mg capsule Take 1 Cap by mouth four (4) times daily for 7 days. , Print, Disp-28 Cap, R-0      traMADol (ULTRAM) 50 mg tablet Take 1 Tab by mouth every six (6) hours as needed for Pain for up to 3 days. Max Daily Amount: 200 mg., Print, Disp-12 Tab, R-0         CONTINUE these medications which have NOT CHANGED    Details   guaiFENesin ER (MUCINEX) 600 mg ER tablet Take 1 Tab by mouth two (2) times a day., Normal, Disp-20 Tab, R-0      tamsulosin (FLOMAX) 0.4 mg capsule Take 0.4 mg by mouth daily. , Historical Med      prasugrel (EFFIENT) 10 mg tablet Take 10 mg by mouth daily. , Historical Med      zolpidem (AMBIEN) 10 mg tablet Take 10 mg by mouth nightly as needed for Sleep., Historical Med      ondansetron (ZOFRAN ODT) 4 mg disintegrating tablet Take 1 Tab by mouth every eight (8) hours as needed for Nausea. , Print, Disp-10 Tab, R-0      Omeprazole delayed release (PRILOSEC D/R) 20 mg tablet Take 20 mg by mouth every morning. Historical Med, 20 mg      omega-3 fatty acids-vitamin e (FISH OIL) 1,000 mg Cap Take 1 Cap by mouth two (2) times a day.   Historical Med, 1 Cap      cholecalciferol, vitamin d3, (VITAMIN D3) 1,000 unit tablet Take 1,000 Units by mouth daily. Historical Med, 1,000 Units      ALPRAZolam (XANAX) 2 mg tablet Take 2 mg by mouth two (2) times a day. Historical Med, 2 mg           2. Follow-up Information     Follow up With Specialties Details Why Contact Info    [facility] EMERGENCY DEPT Emergency Medicine  If symptoms worsen [address] [place] [address]  [phone]    David Armijo MD [facility] Call today  [address] [place] [address]  [phone]          Return to ED if worse     Diagnosis     Clinical Impression:   1.  Cellulitis of right upper extremity

## 2019-07-31 NOTE — ED NOTES
Pt received sleeping but easily aroused and states he thinks the redness and swelling is a little better but he has nausea. MD notified.

## 2019-07-31 NOTE — ED NOTES
Pt discharged home with written and verbal instructions given by Dr. Charolet Carrel and patient ambulated out of ED without difficulty.

## 2019-07-31 NOTE — ED NOTES
Bedside and Verbal shift change report given to Angie Gu RN (oncoming nurse) by Adelina Jett RN (offgoing nurse). Report included the following information SBAR, Kardex, ED Summary and MAR.

## 2019-07-31 NOTE — DISCHARGE INSTRUCTIONS

## 2019-08-04 LAB
BACTERIA SPEC CULT: NORMAL
SERVICE CMNT-IMP: NORMAL

## 2019-12-29 ENCOUNTER — ED HISTORICAL/CONVERTED ENCOUNTER (OUTPATIENT)
Dept: OTHER | Age: 64
End: 2019-12-29

## 2021-09-30 ENCOUNTER — HOSPITAL ENCOUNTER (EMERGENCY)
Age: 66
Discharge: HOME | End: 2021-09-30
Attending: EMERGENCY MEDICINE
Payer: MEDICAID

## 2021-09-30 ENCOUNTER — APPOINTMENT (OUTPATIENT)
Dept: GENERAL RADIOLOGY | Age: 66
End: 2021-09-30
Attending: EMERGENCY MEDICINE
Payer: MEDICAID

## 2021-09-30 ENCOUNTER — APPOINTMENT (OUTPATIENT)
Dept: CT IMAGING | Age: 66
End: 2021-09-30
Attending: PHYSICIAN ASSISTANT
Payer: MEDICAID

## 2021-09-30 VITALS
BODY MASS INDEX: 26.6 KG/M2 | RESPIRATION RATE: 16 BRPM | SYSTOLIC BLOOD PRESSURE: 143 MMHG | DIASTOLIC BLOOD PRESSURE: 76 MMHG | WEIGHT: 175.49 LBS | TEMPERATURE: 98.3 F | HEART RATE: 72 BPM | OXYGEN SATURATION: 100 % | HEIGHT: 68 IN

## 2021-09-30 DIAGNOSIS — M54.50 ACUTE BILATERAL LOW BACK PAIN WITHOUT SCIATICA: ICD-10-CM

## 2021-09-30 DIAGNOSIS — K43.9 HERNIA OF ABDOMINAL WALL: ICD-10-CM

## 2021-09-30 DIAGNOSIS — Z98.1 HISTORY OF LUMBAR SPINAL FUSION: ICD-10-CM
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
## 2021-09-30 DIAGNOSIS — R10.9 FLANK PAIN: ICD-10-CM

## 2021-09-30 DIAGNOSIS — M54.2 NECK PAIN: Primary | ICD-10-CM

## 2021-09-30 LAB
ALBUMIN SERPL-MCNC: 3.3 G/DL (ref 3.5–5)
ALBUMIN/GLOB SERPL: 0.7 {RATIO} (ref 1.1–2.2)
ALP SERPL-CCNC: 122 U/L (ref 45–117)
ALT SERPL-CCNC: 42 U/L (ref 12–78)
ANION GAP SERPL CALC-SCNC: 1 MMOL/L (ref 5–15)
AST SERPL-CCNC: 26 U/L (ref 15–37)
BASOPHILS # BLD: 0 K/UL (ref 0–0.1)
BASOPHILS NFR BLD: 0 % (ref 0–1)
BILIRUB SERPL-MCNC: 0.3 MG/DL (ref 0.2–1)
BUN SERPL-MCNC: 14 MG/DL (ref 6–20)
BUN/CREAT SERPL: 15 (ref 12–20)
CALCIUM SERPL-MCNC: 8.8 MG/DL (ref 8.5–10.1)
CHLORIDE SERPL-SCNC: 106 MMOL/L (ref 97–108)
CO2 SERPL-SCNC: 30 MMOL/L (ref 21–32)
CREAT SERPL-MCNC: 0.92 MG/DL (ref 0.7–1.3)
DIFFERENTIAL METHOD BLD: ABNORMAL
EOSINOPHIL # BLD: 0.1 K/UL (ref 0–0.4)
EOSINOPHIL NFR BLD: 2 % (ref 0–7)
ERYTHROCYTE [DISTWIDTH] IN BLOOD BY AUTOMATED COUNT: 14.2 % (ref 11.5–14.5)
GLOBULIN SER CALC-MCNC: 4.5 G/DL (ref 2–4)
GLUCOSE SERPL-MCNC: 92 MG/DL (ref 65–100)
HCT VFR BLD AUTO: 50.3 % (ref 36.6–50.3)
HGB BLD-MCNC: 17 G/DL (ref 12.1–17)
IMM GRANULOCYTES # BLD AUTO: 0 K/UL (ref 0–0.04)
IMM GRANULOCYTES NFR BLD AUTO: 0 % (ref 0–0.5)
LYMPHOCYTES # BLD: 1.3 K/UL (ref 0.8–3.5)
LYMPHOCYTES NFR BLD: 27 % (ref 12–49)
MCH RBC QN AUTO: 31.2 PG (ref 26–34)
MCHC RBC AUTO-ENTMCNC: 33.8 G/DL (ref 30–36.5)
MCV RBC AUTO: 92.3 FL (ref 80–99)
MONOCYTES # BLD: 0.4 K/UL (ref 0–1)
MONOCYTES NFR BLD: 8 % (ref 5–13)
NEUTS SEG # BLD: 3 K/UL (ref 1.8–8)
NEUTS SEG NFR BLD: 63 % (ref 32–75)
NRBC # BLD: 0 K/UL (ref 0–0.01)
NRBC BLD-RTO: 0 PER 100 WBC
PLATELET # BLD AUTO: 120 K/UL (ref 150–400)
PMV BLD AUTO: 10.7 FL (ref 8.9–12.9)
POTASSIUM SERPL-SCNC: 4.2 MMOL/L (ref 3.5–5.1)
PROT SERPL-MCNC: 7.8 G/DL (ref 6.4–8.2)
RBC # BLD AUTO: 5.45 M/UL (ref 4.1–5.7)
SODIUM SERPL-SCNC: 137 MMOL/L (ref 136–145)
WBC # BLD AUTO: 4.8 K/UL (ref 4.1–11.1)

## 2021-09-30 PROCEDURE — 72100 X-RAY EXAM L-S SPINE 2/3 VWS: CPT

## 2021-09-30 PROCEDURE — 74011000636 HC RX REV CODE- 636: Performed by: EMERGENCY MEDICINE

## 2021-09-30 PROCEDURE — 71101 X-RAY EXAM UNILAT RIBS/CHEST: CPT

## 2021-09-30 PROCEDURE — 72125 CT NECK SPINE W/O DYE: CPT

## 2021-09-30 PROCEDURE — 80053 COMPREHEN METABOLIC PANEL: CPT

## 2021-09-30 PROCEDURE — 85025 COMPLETE CBC W/AUTO DIFF WBC: CPT

## 2021-09-30 PROCEDURE — 36415 COLL VENOUS BLD VENIPUNCTURE: CPT

## 2021-09-30 PROCEDURE — 74177 CT ABD & PELVIS W/CONTRAST: CPT

## 2021-09-30 PROCEDURE — 99282 EMERGENCY DEPT VISIT SF MDM: CPT

## 2021-09-30 RX ORDER — LIDOCAINE 50 MG/G
PATCH TOPICAL
Qty: 5 EACH | Refills: 0 | Status: SHIPPED | OUTPATIENT
Start: 2021-09-30

## 2021-09-30 RX ORDER — TIZANIDINE 4 MG/1
4 TABLET ORAL
Qty: 15 TABLET | Refills: 0 | Status: SHIPPED | OUTPATIENT
Start: 2021-09-30 | End: 2021-10-05

## 2021-09-30 RX ORDER — MELOXICAM 15 MG/1
15 TABLET ORAL DAILY
Qty: 10 TABLET | Refills: 0 | Status: SHIPPED | OUTPATIENT
Start: 2021-09-30 | End: 2021-10-10

## 2021-09-30 RX ORDER — HYDROCODONE BITARTRATE AND ACETAMINOPHEN 5; 325 MG/1; MG/1
1 TABLET ORAL
Qty: 8 TABLET | Refills: 0 | Status: SHIPPED | OUTPATIENT
Start: 2021-09-30 | End: 2021-10-03

## 2021-09-30 RX ADMIN — IOPAMIDOL 100 ML: 755 INJECTION, SOLUTION INTRAVENOUS at 18:34

## 2021-09-30 NOTE — ED NOTES
JOSE RAFAEL Banks reviewed discharge instructions with the patient. The patient verbalized understanding.

## 2021-09-30 NOTE — LETTER
[address]  [facility] EMERGENCY DEPT  [address] [facility] [address]  [place] [id number] [phone]    Work/School Note    Date: 9/30/2021    To Whom It May concern:      Nicolasa Guevara was seen and treated today in the emergency room. He may return to work in 2 to 3 days, as symptoms improve.           Sincerely,          Trav Gonzalez

## 2021-09-30 NOTE — DISCHARGE INSTRUCTIONS
Rest, ice/cool compresses several times daily x 2 days, then alternate ice/moist heat, gentle stretching, massage. Avoid prolonged sitting. Follow up with primary care for recheck. Contact information provided for Orthopedist and General surgeon (hernia) if symptoms persist.  Return to the Emergency Dept for any continued/worsening pain.
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
## 2021-10-11 ENCOUNTER — HOSPITAL ENCOUNTER (EMERGENCY)
Age: 66
Discharge: HOME | End: 2021-10-11
Attending: EMERGENCY MEDICINE
Payer: MEDICAID

## 2021-10-11 VITALS
SYSTOLIC BLOOD PRESSURE: 138 MMHG | HEART RATE: 73 BPM | TEMPERATURE: 98.3 F | OXYGEN SATURATION: 100 % | BODY MASS INDEX: 25.99 KG/M2 | HEIGHT: 69 IN | RESPIRATION RATE: 16 BRPM | WEIGHT: 175.49 LBS | DIASTOLIC BLOOD PRESSURE: 76 MMHG
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
## 2021-10-11 DIAGNOSIS — M54.2 NECK PAIN: ICD-10-CM

## 2021-10-11 DIAGNOSIS — G89.29 ACUTE EXACERBATION OF CHRONIC LOW BACK PAIN: Primary | ICD-10-CM

## 2021-10-11 DIAGNOSIS — M54.50 ACUTE EXACERBATION OF CHRONIC LOW BACK PAIN: Primary | ICD-10-CM

## 2021-10-11 PROCEDURE — 99281 EMR DPT VST MAYX REQ PHY/QHP: CPT

## 2021-10-11 RX ORDER — METHYLPREDNISOLONE 4 MG/1
TABLET ORAL
Qty: 1 DOSE PACK | Refills: 0 | Status: SHIPPED | OUTPATIENT
Start: 2021-10-11

## 2021-10-11 RX ORDER — CYCLOBENZAPRINE HCL 10 MG
10 TABLET ORAL
Qty: 15 TABLET | Refills: 0 | Status: SHIPPED | OUTPATIENT
Start: 2021-10-11 | End: 2021-10-16

## 2021-10-11 RX ORDER — HYDROCODONE BITARTRATE AND ACETAMINOPHEN 5; 325 MG/1; MG/1
1 TABLET ORAL
Qty: 6 TABLET | Refills: 0 | Status: SHIPPED | OUTPATIENT
Start: 2021-10-11 | End: 2021-10-14
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
## 2021-10-26 ENCOUNTER — TRANSCRIBE ORDER (OUTPATIENT)
Dept: SCHEDULING | Age: 66
End: 2021-10-26

## 2021-10-26 DIAGNOSIS — Z98.1 S/P LUMBAR FUSION: Primary | ICD-10-CM

## 2021-10-26 DIAGNOSIS — M50.30 DEGENERATIVE CERVICAL DISC: ICD-10-CM

## 2021-10-26 DIAGNOSIS — G89.29 CHRONIC NECK PAIN: ICD-10-CM

## 2021-10-26 DIAGNOSIS — M54.50 LUMBAGO: ICD-10-CM

## 2021-10-26 DIAGNOSIS — M54.2 CHRONIC NECK PAIN: ICD-10-CM

## 2022-01-06 ENCOUNTER — APPOINTMENT (OUTPATIENT)
Dept: GENERAL RADIOLOGY | Age: 67
End: 2022-01-06
Attending: STUDENT IN AN ORGANIZED HEALTH CARE EDUCATION/TRAINING PROGRAM
Payer: MEDICARE

## 2022-01-06 ENCOUNTER — HOSPITAL ENCOUNTER (EMERGENCY)
Age: 67
Discharge: HOME | End: 2022-01-06
Attending: STUDENT IN AN ORGANIZED HEALTH CARE EDUCATION/TRAINING PROGRAM
Payer: MEDICARE

## 2022-01-06 VITALS
HEIGHT: 69 IN | TEMPERATURE: 97.4 F | SYSTOLIC BLOOD PRESSURE: 138 MMHG | HEART RATE: 80 BPM | OXYGEN SATURATION: 100 % | RESPIRATION RATE: 16 BRPM | BODY MASS INDEX: 26.58 KG/M2 | DIASTOLIC BLOOD PRESSURE: 73 MMHG | WEIGHT: 179.45 LBS

## 2022-01-06 DIAGNOSIS — R05.9 COUGH: Primary | ICD-10-CM

## 2022-01-06 DIAGNOSIS — R07.9 CHEST PAIN, UNSPECIFIED TYPE: ICD-10-CM

## 2022-01-06 LAB
ALBUMIN SERPL-MCNC: 3.1 G/DL (ref 3.5–5)
ALBUMIN/GLOB SERPL: 0.7 {RATIO} (ref 1.1–2.2)
ALP SERPL-CCNC: 112 U/L (ref 45–117)
ALT SERPL-CCNC: 36 U/L (ref 12–78)
ANION GAP SERPL CALC-SCNC: 2 MMOL/L (ref 5–15)
AST SERPL-CCNC: 23 U/L (ref 15–37)
BASOPHILS # BLD: 0.1 K/UL (ref 0–0.1)
BASOPHILS NFR BLD: 1 % (ref 0–1)
BILIRUB SERPL-MCNC: 0.3 MG/DL (ref 0.2–1)
BUN SERPL-MCNC: 11 MG/DL (ref 6–20)
BUN/CREAT SERPL: 12 (ref 12–20)
CALCIUM SERPL-MCNC: 9 MG/DL (ref 8.5–10.1)
CHLORIDE SERPL-SCNC: 107 MMOL/L (ref 97–108)
CO2 SERPL-SCNC: 30 MMOL/L (ref 21–32)
CREAT SERPL-MCNC: 0.89 MG/DL (ref 0.7–1.3)
DIFFERENTIAL METHOD BLD: ABNORMAL
EOSINOPHIL # BLD: 0.1 K/UL (ref 0–0.4)
EOSINOPHIL NFR BLD: 2 % (ref 0–7)
ERYTHROCYTE [DISTWIDTH] IN BLOOD BY AUTOMATED COUNT: 16.4 % (ref 11.5–14.5)
GLOBULIN SER CALC-MCNC: 4.6 G/DL (ref 2–4)
GLUCOSE SERPL-MCNC: 74 MG/DL (ref 65–100)
HCT VFR BLD AUTO: 46.4 % (ref 36.6–50.3)
HGB BLD-MCNC: 16.1 G/DL (ref 12.1–17)
IMM GRANULOCYTES # BLD AUTO: 0.1 K/UL (ref 0–0.04)
IMM GRANULOCYTES NFR BLD AUTO: 1 % (ref 0–0.5)
LYMPHOCYTES # BLD: 1.2 K/UL (ref 0.8–3.5)
LYMPHOCYTES NFR BLD: 20 % (ref 12–49)
MCH RBC QN AUTO: 31.6 PG (ref 26–34)
MCHC RBC AUTO-ENTMCNC: 34.7 G/DL (ref 30–36.5)
MCV RBC AUTO: 91.2 FL (ref 80–99)
MONOCYTES # BLD: 0.5 K/UL (ref 0–1)
MONOCYTES NFR BLD: 9 % (ref 5–13)
NEUTS SEG # BLD: 3.9 K/UL (ref 1.8–8)
NEUTS SEG NFR BLD: 67 % (ref 32–75)
NRBC # BLD: 0 K/UL (ref 0–0.01)
NRBC BLD-RTO: 0 PER 100 WBC
PLATELET # BLD AUTO: 188 K/UL (ref 150–400)
PMV BLD AUTO: 10.8 FL (ref 8.9–12.9)
POTASSIUM SERPL-SCNC: 4.3 MMOL/L (ref 3.5–5.1)
PROT SERPL-MCNC: 7.7 G/DL (ref 6.4–8.2)
RBC # BLD AUTO: 5.09 M/UL (ref 4.1–5.7)
SARS-COV-2, COV2: NORMAL
SODIUM SERPL-SCNC: 139 MMOL/L (ref 136–145)
TROPONIN-HIGH SENSITIVITY: 5 NG/L (ref 0–76)
WBC # BLD AUTO: 5.9 K/UL (ref 4.1–11.1)

## 2022-01-06 PROCEDURE — 99283 EMERGENCY DEPT VISIT LOW MDM: CPT

## 2022-01-06 PROCEDURE — 84484 ASSAY OF TROPONIN QUANT: CPT

## 2022-01-06 PROCEDURE — 36415 COLL VENOUS BLD VENIPUNCTURE: CPT

## 2022-01-06 PROCEDURE — 80053 COMPREHEN METABOLIC PANEL: CPT

## 2022-01-06 PROCEDURE — 93005 ELECTROCARDIOGRAM TRACING: CPT

## 2022-01-06 PROCEDURE — 85025 COMPLETE CBC W/AUTO DIFF WBC: CPT

## 2022-01-06 PROCEDURE — U0005 INFEC AGEN DETEC AMPLI PROBE: HCPCS

## 2022-01-06 PROCEDURE — 71045 X-RAY EXAM CHEST 1 VIEW: CPT

## 2022-01-06 RX ORDER — FLUTICASONE PROPIONATE 50 MCG
2 SPRAY, SUSPENSION (ML) NASAL DAILY
Qty: 16 G | Refills: 0 | Status: SHIPPED | OUTPATIENT
Start: 2022-01-06

## 2022-01-07 ENCOUNTER — PATIENT OUTREACH (OUTPATIENT)
Dept: CASE MANAGEMENT | Age: 67
End: 2022-01-07

## 2022-01-07 LAB
ATRIAL RATE: 78 BPM
CALCULATED P AXIS, ECG09: 60 DEGREES
CALCULATED R AXIS, ECG10: 4 DEGREES
CALCULATED T AXIS, ECG11: -23 DEGREES
DIAGNOSIS, 93000: NORMAL
P-R INTERVAL, ECG05: 138 MS
Q-T INTERVAL, ECG07: 350 MS
QRS DURATION, ECG06: 80 MS
QTC CALCULATION (BEZET), ECG08: 399 MS
VENTRICULAR RATE, ECG03: 78 BPM

## 2022-01-07 NOTE — ACP (ADVANCE CARE PLANNING)
Advance Care Planning:   Does patient have an Advance Directive: not on file; education provided. Relative, Temo Kevinannamariehailey remains as Healthcare Decision Maker.

## 2022-01-07 NOTE — PROGRESS NOTES
Education Provided due to At Risk Condition: ED 2022:  Ear Pain/Cough/Congestion    Patient contacted regarding COVID-19 Risk. Discussed COVID-19 related testing which was available at this time. Test results were negative. Patient informed of results, if available? yes. Ambulatory Care Manager contacted the patient by telephone to perform post discharge assessment. Call within 2 business days of discharge: Yes Verified name and  with patient as identifiers. Provided introduction to self, and explanation of the CTN/ACM role, and reason for call due to risk factors for infection and/or exposure to COVID-19. Symptoms reviewed with patient who verbalized the following symptoms: cough/ear pain. Due to no new or worsening symptoms encounter was not routed to provider for escalation. Discussed follow-up appointments. If no appointment was previously scheduled, appointment scheduling offered:  yes. [facility] follow up appointment(s): No future appointments. Non-[facility] follow up appointment(s): none    Interventions to address risk factors: Education of patient/family/caregiver/guardian to support self-management-Cough Management interventions;  CDC Guidelines for prevention     Advance Care Planning:   Does patient have an Advance Directive: not on file; education provided. Relative, Elvin Block remains as Healthcare Decision Maker. Educated patient about risk for severe COVID-19 due to risk factors according to CDC guidelines. ACM reviewed discharge instructions, medical action plan and red flag symptoms with the patient who verbalized understanding. Discussed COVID vaccination status: yes. Education provided on COVID-19 vaccination as appropriate. Discussed exposure protocols and quarantine with CDC Guidelines. Patient was given an opportunity to verbalize any questions and concerns and agrees to contact ACM or health care provider for questions related to their healthcare.     Reviewed and educated patient on any new and changed medications related to discharge diagnosis. Discuss cough medication needed; Advised PCP contact and/or PharmD over the counter consult due to blood thinners. Was patient discharged with a pulse oximeter? no     ACM provided contact information. Plan for follow-up call in 5-7 days based on severity of symptoms and risk factors.

## 2022-01-07 NOTE — ED PROVIDER NOTES
EMERGENCY DEPARTMENT HISTORY AND PHYSICAL EXAM      Date: 1/6/2022  Patient Name: Monserrat Vance    History of Presenting Illness     Chief Complaint   Patient presents with    Ear Pain     sx x 1.5 weeks. Both ears are stopped up.  Cough    Nasal Congestion    Shortness of Breath     x 2 days         HPI: Monserrat Vance, 77 y.o. male presents to the ED with cc of A week and a half of sore throat, cough productive of yellow sputum, ear aches bilaterally and headache. He also states he has been having some sweats at night, no measured fevers. He reports some shortness of breath that has been coming on and off randomly. Also states that over the last week and a half he has had some random, nonexertional pressure-like chest pain. No nausea or diaphoresis associated with the pain. He has had both of his Covid vaccines. There are no other complaints, changes, or physical findings at this time. PCP: Ciera Jean MD    No current facility-administered medications on file prior to encounter. Current Outpatient Medications on File Prior to Encounter   Medication Sig Dispense Refill    methylPREDNISolone (Medrol, Ayo,) 4 mg tablet Take as directed on package 1 Dose Pack 0    lidocaine (LIDODERM) 5 % Apply patch to the affected area for 12 hours a day and remove for 12 hours a day. 5 Each 0    tamsulosin (FLOMAX) 0.4 mg capsule Take 0.4 mg by mouth daily.  prasugrel (EFFIENT) 10 mg tablet Take 10 mg by mouth daily.  ondansetron (ZOFRAN ODT) 4 mg disintegrating tablet Take 1 Tab by mouth every eight (8) hours as needed for Nausea. 10 Tab 0    Omeprazole delayed release (PRILOSEC D/R) 20 mg tablet Take 20 mg by mouth every morning.  omega-3 fatty acids-vitamin e (FISH OIL) 1,000 mg Cap Take 1 Cap by mouth two (2) times a day.            Past History     Past Medical History:  Past Medical History:   Diagnosis Date    Arthritis     Chronic pain     GERD (gastroesophageal reflux disease)     Hypertension     Liver disease 1970'S    HX HEPATITIS    Nausea & vomiting     Psychiatric disorder     DEPRESSION AND ANXIETY       Past Surgical History:  Past Surgical History:   Procedure Laterality Date    ENDOSCOPY VISIT-OUTPATIENT  2003    HX BACK SURGERY      LUMBAR SPINE X 7       Family History:  Family History   Problem Relation Age of Onset    Cancer Father         BRAIN    Heart Disease Maternal Aunt     Stroke Maternal Aunt     Heart Disease Maternal Uncle     Stroke Maternal Uncle     Cancer Paternal Uncle         PROSTATE    Stroke Maternal Grandmother     Cancer Paternal Grandmother         BREAST    Heart Disease Sister     Heart Disease Sister        Social History:  Social History     Tobacco Use    Smoking status: Current Every Day Smoker     Packs/day: 1.00     Years: 40.00     Pack years: 40.00    Smokeless tobacco: Never Used   Substance Use Topics    Alcohol use: No     Comment: RECOVERING ALCOHOLIC    Drug use: No       Allergies: Allergies   Allergen Reactions    Latex Hives    Adhesive Rash    Codeine Hives     ITCHING, NAUSEA    Morphine Itching     NAUSEA         Review of Systems   no fever  Reports ear fullness  No eye pain  Reports shortness of breath  Reports chest pain  no abdominal pain  no dysuria  no leg pain  No rash  No lymphadenopathy  No weight loss    Physical Exam   Physical Exam  Constitutional:       General: He is not in acute distress. Appearance: He is not toxic-appearing. HENT:      Head: Normocephalic and atraumatic. Right Ear: Tympanic membrane and ear canal normal. There is no impacted cerumen. Left Ear: Ear canal normal. There is no impacted cerumen. Ears:      Comments: Left tympanic membrane with some fluid behind the TM, it is nonpurulent, no bulging, mild erythema     Mouth/Throat:      Mouth: Mucous membranes are moist.   Eyes:      Extraocular Movements: Extraocular movements intact. Cardiovascular:      Rate and Rhythm: Normal rate and regular rhythm. Pulmonary:      Effort: Pulmonary effort is normal.      Breath sounds: Normal breath sounds. Abdominal:      Palpations: Abdomen is soft. Tenderness: There is no abdominal tenderness. Musculoskeletal:         General: No deformity. Cervical back: Neck supple. Skin:     General: Skin is warm and dry. Neurological:      General: No focal deficit present. Mental Status: He is alert and oriented to person, place, and time. Psychiatric:         Mood and Affect: Mood normal.         Diagnostic Study Results     Labs -     Recent Results (from the past 24 hour(s))   EKG, 12 LEAD, INITIAL    Collection Time: 01/06/22  5:30 PM   Result Value Ref Range    Ventricular Rate 78 BPM    Atrial Rate 78 BPM    P-R Interval 138 ms    QRS Duration 80 ms    Q-T Interval 350 ms    QTC Calculation (Bezet) 399 ms    Calculated P Axis 60 degrees    Calculated R Axis 4 degrees    Calculated T Axis -23 degrees    Diagnosis       Normal sinus rhythm  Inferior infarct , age undetermined  When compared with ECG of 09-APR-2019 16:26,  Inferior infarct is now present  ST no longer elevated in Inferior leads  T wave inversion now evident in Inferior leads     CBC WITH AUTOMATED DIFF    Collection Time: 01/06/22  9:09 PM   Result Value Ref Range    WBC 5.9 4.1 - 11.1 K/uL    RBC 5.09 4. 10 - 5.70 M/uL    HGB 16.1 12.1 - 17.0 g/dL    HCT 46.4 36.6 - 50.3 %    MCV 91.2 80.0 - 99.0 FL    MCH 31.6 26.0 - 34.0 PG    MCHC 34.7 30.0 - 36.5 g/dL    RDW 16.4 (H) 11.5 - 14.5 %    PLATELET 772 807 - 963 K/uL    MPV 10.8 8.9 - 12.9 FL    NRBC 0.0 0  WBC    ABSOLUTE NRBC 0.00 0.00 - 0.01 K/uL    NEUTROPHILS 67 32 - 75 %    LYMPHOCYTES 20 12 - 49 %    MONOCYTES 9 5 - 13 %    EOSINOPHILS 2 0 - 7 %    BASOPHILS 1 0 - 1 %    IMMATURE GRANULOCYTES 1 (H) 0.0 - 0.5 %    ABS. NEUTROPHILS 3.9 1.8 - 8.0 K/UL    ABS. LYMPHOCYTES 1.2 0.8 - 3.5 K/UL    ABS.  MONOCYTES 0.5 0.0 - 1.0 K/UL    ABS. EOSINOPHILS 0.1 0.0 - 0.4 K/UL    ABS. BASOPHILS 0.1 0.0 - 0.1 K/UL    ABS. IMM. GRANS. 0.1 (H) 0.00 - 0.04 K/UL    DF AUTOMATED     SARS-COV-2    Collection Time: 01/06/22  9:09 PM   Result Value Ref Range    SARS-CoV-2 Please find results under separate order     METABOLIC PANEL, COMPREHENSIVE    Collection Time: 01/06/22  9:52 PM   Result Value Ref Range    Sodium 139 136 - 145 mmol/L    Potassium 4.3 3.5 - 5.1 mmol/L    Chloride 107 97 - 108 mmol/L    CO2 30 21 - 32 mmol/L    Anion gap 2 (L) 5 - 15 mmol/L    Glucose 74 65 - 100 mg/dL    BUN 11 6 - 20 MG/DL    Creatinine 0.89 0.70 - 1.30 MG/DL    BUN/Creatinine ratio 12 12 - 20      GFR est AA >60 >60 ml/min/1.73m2    GFR est non-AA >60 >60 ml/min/1.73m2    Calcium 9.0 8.5 - 10.1 MG/DL    Bilirubin, total 0.3 0.2 - 1.0 MG/DL    ALT (SGPT) 36 12 - 78 U/L    AST (SGOT) 23 15 - 37 U/L    Alk. phosphatase 112 45 - 117 U/L    Protein, total 7.7 6.4 - 8.2 g/dL    Albumin 3.1 (L) 3.5 - 5.0 g/dL    Globulin 4.6 (H) 2.0 - 4.0 g/dL    A-G Ratio 0.7 (L) 1.1 - 2.2     TROPONIN-HIGH SENSITIVITY    Collection Time: 01/06/22  9:52 PM   Result Value Ref Range    Troponin-High Sensitivity 5 0 - 76 ng/L       Radiologic Studies -   XR CHEST PORT   Final Result   No acute cardiopulmonary disease. CT Results  (Last 48 hours)    None        CXR Results  (Last 48 hours)               01/06/22 2133  XR CHEST PORT Final result    Impression:  No acute cardiopulmonary disease. Narrative:  INDICATION: Cough, nasal congestion, shortness of breath. Portable AP view of the chest.       Direct comparison made to prior chest x-ray dated April 2019. Cardiomediastinal silhouette is stable. Lungs are clear bilaterally. Pleural   spaces are normal and there is no pneumothorax. Osseous structures are stable. Medical Decision Making   I am the first provider for this patient.     I reviewed the vital signs, available nursing notes, past medical history, past surgical history, family history and social history. Vital Signs-Reviewed the patient's vital signs. Patient Vitals for the past 24 hrs:   Temp Pulse Resp BP SpO2   01/06/22 1723 97.4 °F (36.3 °C) 80 16 138/73 100 %         Provider Notes (Medical Decision Making):   28-year-old male presenting with cough, sore throat, headache and earaches. Symptoms concerning for possible URI, Covid, viral syndrome, bronchitis, less likely pneumonia. He does report some associated chest pain, atypical for ACS, nonexertional, however given age and comorbidities, ACS work-up also initiated. ED Course:     Initial assessment performed. The patients presenting problems have been discussed, and they are in agreement with the care plan formulated and outlined with them. I have encouraged them to ask questions as they arise throughout their visit. EKG is performed at 17: 30, shows sinus rhythm at a rate of 78, , QRS 80, QTc 399, axis upright, no ST segment elevation or depression concerning for ACS, this is interpreted as normal sinus rhythm    CBC negative for leukocytosis or anemia, basic metabolic panel with normal renal function, no worrisome electrolyte abnormalities, troponin is not elevated, chest x-ray unremarkable. Will test for Covid. Patient is counseled on supportive care and return precautions. Will return to the ED for any worsening pain, shortness of breath, fevers, or any new or worrisome symptoms. Will followup with primary care doctor, cardiologist within 5-7 days. Critical Care Time:         Disposition:  Home    PLAN:  1. Discharge Medication List as of 1/6/2022 10:31 PM      START taking these medications    Details   fluticasone propionate (FLONASE) 50 mcg/actuation nasal spray 2 Sprays by Both Nostrils route daily. , Normal, Disp-16 g, R-0         CONTINUE these medications which have NOT CHANGED    Details   methylPREDNISolone (Medrol, Ayo,) 4 mg tablet Take as directed on package, Normal, Disp-1 Dose Pack, R-0      lidocaine (LIDODERM) 5 % Apply patch to the affected area for 12 hours a day and remove for 12 hours a day., Normal, Disp-5 Each, R-0      tamsulosin (FLOMAX) 0.4 mg capsule Take 0.4 mg by mouth daily. , Historical Med      prasugrel (EFFIENT) 10 mg tablet Take 10 mg by mouth daily. , Historical Med      ondansetron (ZOFRAN ODT) 4 mg disintegrating tablet Take 1 Tab by mouth every eight (8) hours as needed for Nausea. , Print, Disp-10 Tab, R-0      Omeprazole delayed release (PRILOSEC D/R) 20 mg tablet Take 20 mg by mouth every morning. Historical Med, 20 mg      omega-3 fatty acids-vitamin e (FISH OIL) 1,000 mg Cap Take 1 Cap by mouth two (2) times a day. Historical Med, 1 Cap           2.    Follow-up Information     Follow up With Specialties Details Why Contact Gómez Marsh MD Family Medicine Schedule an appointment as soon as possible for a visit in 1 week  [address] [place] [address]  [phone]      Your cardiologist  Schedule an appointment as soon as possible for a visit in 1 week      [facility] EMERGENCY DEPT Emergency Medicine  As needed, If symptoms worsen [address] [place] [address]  [phone]        Return to ED if worse     Diagnosis     Clinical Impression: Acute cough and sore throat, acute atypical chest pain

## 2022-01-09 LAB
SARS-COV-2, XPLCVT: NOT DETECTED
SOURCE, COVRS: NORMAL

## 2022-03-18 PROBLEM — F99 PSYCHIATRIC DISORDER: Status: ACTIVE | Noted: 2017-10-01

## 2022-03-18 PROBLEM — A41.9 SEPTIC SHOCK (HCC): Status: ACTIVE | Noted: 2017-10-01

## 2022-03-18 PROBLEM — R65.21 SEPTIC SHOCK (HCC): Status: ACTIVE | Noted: 2017-10-01

## 2022-03-19 PROBLEM — K76.9 LIVER DISEASE: Status: ACTIVE | Noted: 2017-10-01

## 2022-03-19 PROBLEM — I10 HTN (HYPERTENSION): Status: ACTIVE | Noted: 2017-10-01

## 2022-03-20 PROBLEM — J18.9 PNEUMONIA: Status: ACTIVE | Noted: 2017-10-01

## 2022-03-20 PROBLEM — G89.29 CHRONIC PAIN: Status: ACTIVE | Noted: 2017-10-01

## 2023-01-04 ENCOUNTER — HOSPITAL ENCOUNTER (OUTPATIENT)
Dept: PREADMISSION TESTING | Age: 68
Discharge: HOME | End: 2023-01-04
Attending: UROLOGY
Payer: MEDICARE

## 2023-01-04 VITALS
DIASTOLIC BLOOD PRESSURE: 69 MMHG | OXYGEN SATURATION: 98 % | WEIGHT: 185.41 LBS | SYSTOLIC BLOOD PRESSURE: 116 MMHG | TEMPERATURE: 98.7 F | HEIGHT: 67 IN | BODY MASS INDEX: 29.1 KG/M2 | RESPIRATION RATE: 16 BRPM | HEART RATE: 70 BPM

## 2023-01-04 LAB
ALBUMIN SERPL-MCNC: 3.6 G/DL (ref 3.5–5)
ALBUMIN/GLOB SERPL: 0.8 {RATIO} (ref 1.1–2.2)
ALP SERPL-CCNC: 101 U/L (ref 45–117)
ALT SERPL-CCNC: 32 U/L (ref 12–78)
ANION GAP SERPL CALC-SCNC: 2 MMOL/L (ref 5–15)
APPEARANCE UR: CLEAR
AST SERPL-CCNC: 21 U/L (ref 15–37)
BACTERIA URNS QL MICRO: NEGATIVE /HPF
BILIRUB SERPL-MCNC: 1.1 MG/DL (ref 0.2–1)
BILIRUB UR QL: NEGATIVE
BUN SERPL-MCNC: 15 MG/DL (ref 6–20)
BUN/CREAT SERPL: 16 (ref 12–20)
CALCIUM SERPL-MCNC: 9.2 MG/DL (ref 8.5–10.1)
CHLORIDE SERPL-SCNC: 109 MMOL/L (ref 97–108)
CO2 SERPL-SCNC: 29 MMOL/L (ref 21–32)
COLOR UR: ABNORMAL
CREAT SERPL-MCNC: 0.93 MG/DL (ref 0.7–1.3)
EPITH CASTS URNS QL MICRO: ABNORMAL /LPF
ERYTHROCYTE [DISTWIDTH] IN BLOOD BY AUTOMATED COUNT: 14.6 % (ref 11.5–14.5)
GLOBULIN SER CALC-MCNC: 4.4 G/DL (ref 2–4)
GLUCOSE SERPL-MCNC: 97 MG/DL (ref 65–100)
GLUCOSE UR STRIP.AUTO-MCNC: NEGATIVE MG/DL
HCT VFR BLD AUTO: 47.9 % (ref 36.6–50.3)
HGB BLD-MCNC: 16.3 G/DL (ref 12.1–17)
HGB UR QL STRIP: NEGATIVE
HYALINE CASTS URNS QL MICRO: ABNORMAL /LPF (ref 0–2)
KETONES UR QL STRIP.AUTO: ABNORMAL MG/DL
LEUKOCYTE ESTERASE UR QL STRIP.AUTO: NEGATIVE
MCH RBC QN AUTO: 31.3 PG (ref 26–34)
MCHC RBC AUTO-ENTMCNC: 34 G/DL (ref 30–36.5)
MCV RBC AUTO: 91.9 FL (ref 80–99)
NITRITE UR QL STRIP.AUTO: NEGATIVE
NRBC # BLD: 0 K/UL (ref 0–0.01)
NRBC BLD-RTO: 0 PER 100 WBC
PH UR STRIP: 5.5 [PH] (ref 5–8)
PLATELET # BLD AUTO: 106 K/UL (ref 150–400)
PMV BLD AUTO: 10.6 FL (ref 8.9–12.9)
POTASSIUM SERPL-SCNC: 3.8 MMOL/L (ref 3.5–5.1)
PROT SERPL-MCNC: 8 G/DL (ref 6.4–8.2)
PROT UR STRIP-MCNC: NEGATIVE MG/DL
RBC # BLD AUTO: 5.21 M/UL (ref 4.1–5.7)
RBC #/AREA URNS HPF: ABNORMAL /HPF (ref 0–5)
SODIUM SERPL-SCNC: 140 MMOL/L (ref 136–145)
SP GR UR REFRACTOMETRY: 1.03
UA: UC IF INDICATED,UAUC: ABNORMAL
UROBILINOGEN UR QL STRIP.AUTO: 1 EU/DL (ref 0.2–1)
WBC # BLD AUTO: 5 K/UL (ref 4.1–11.1)
WBC URNS QL MICRO: ABNORMAL /HPF (ref 0–4)

## 2023-01-04 PROCEDURE — 93005 ELECTROCARDIOGRAM TRACING: CPT

## 2023-01-04 PROCEDURE — 80053 COMPREHEN METABOLIC PANEL: CPT

## 2023-01-04 PROCEDURE — 81001 URINALYSIS AUTO W/SCOPE: CPT

## 2023-01-04 PROCEDURE — 85027 COMPLETE CBC AUTOMATED: CPT

## 2023-01-04 PROCEDURE — 36415 COLL VENOUS BLD VENIPUNCTURE: CPT

## 2023-01-04 RX ORDER — PANTOPRAZOLE SODIUM 40 MG/1
40 TABLET, DELAYED RELEASE ORAL DAILY
COMMUNITY

## 2023-01-04 RX ORDER — LISINOPRIL 20 MG/1
20 TABLET ORAL DAILY
COMMUNITY

## 2023-01-04 RX ORDER — ALPRAZOLAM 2 MG/1
2 TABLET ORAL
COMMUNITY

## 2023-01-04 RX ORDER — ZOLPIDEM TARTRATE 10 MG/1
10 TABLET ORAL
COMMUNITY

## 2023-01-04 RX ORDER — HYDROMORPHONE HYDROCHLORIDE 4 MG/1
4 TABLET ORAL AS NEEDED
COMMUNITY

## 2023-01-04 RX ORDER — ATORVASTATIN CALCIUM 20 MG/1
20 TABLET, FILM COATED ORAL DAILY
COMMUNITY

## 2023-01-04 RX ORDER — ASPIRIN 81 MG/1
81 TABLET ORAL DAILY
COMMUNITY

## 2023-01-04 RX ORDER — TERBINAFINE HYDROCHLORIDE 250 MG/1
250 TABLET ORAL DAILY
COMMUNITY

## 2023-01-04 NOTE — PERIOP NOTES
[facility]  Preoperative Instructions        Surgery Date Tuesday, 1/10/23          Time of Arrival TBD/TBC @ [phone]    1. On the day of your surgery, please report to the Surgical Services Registration Desk and sign in at your designated time. The Surgery Center is located to the right of the Emergency Room. 2. You must have someone with you to drive you home. You should not drive a car for 24 hours following surgery. Please make arrangements for a friend or family member to stay with you for the first 24 hours after your surgery. 3. Do not have anything to eat or drink (including water, gum, mints, coffee, juice) after midnight Monday, 1/9/23. ? This may not apply to medications prescribed by your physician. ?(Please note below the special instructions with medications to take the morning of your procedure.)    4. We recommend you do not drink any alcoholic beverages for 24 hours before and after your surgery. 5. Contact your surgeons office for instructions on the following medications: non-steroidal anti-inflammatory drugs (i.e. Advil, Aleve), vitamins, and supplements. (Some surgeons will want you to stop these medications prior to surgery and others may allow you to take them)  **If you are currently taking Plavix, Coumadin, Aspirin and/or other blood-thinning agents, contact your surgeon for instructions. ** Your surgeon will partner with the physician prescribing these medications to determine if it is safe to stop or if you need to continue taking. Please do not stop taking these medications without instructions from your surgeon    6. Wear comfortable clothes. Wear glasses instead of contacts. Do not bring any money or jewelry. Please bring picture ID, insurance card, and any prearranged co-payment or hospital payment. Do not wear make-up, particularly mascara the morning of your surgery.   Do not wear nail polish, particularly if you are having foot /hand surgery. Wear your hair loose or down, no ponytails, buns, sol pins or clips. All body piercings must be removed. Please shower with antibacterial soap for three consecutive days before and on the morning of surgery, but do not apply any lotions, powders or deodorants after the shower on the day of surgery. Please use a fresh towels after each shower. Please sleep in clean clothes and change bed linens the night before surgery. Please do not shave for 48 hours prior to surgery. Shaving of the face is acceptable. 7. You should understand that if you do not follow these instructions your surgery may be cancelled. If your physical condition changes (I.e. fever, cold or flu) please contact your surgeon as soon as possible. 8. It is important that you be on time. If a situation occurs where you may be late, please call [phone] (OR Holding Area). 9. If you have any questions and or problems, please call [phone] (Pre-admission Testing). 10. Your surgery time may be subject to change. You will receive a phone call the evening prior if your time changes. 11.  If having outpatient surgery, you must have someone to drive you here, stay with you during the duration of your stay, and to drive you home at time of discharge. SPECIAL INSTRUCTIONS/TAKE ALL MEDICATIONS DAY OF SURGERY EXCEPT: Follow your physician/surgeon instructions for holding all non-steroidal anti-inflammatory drugs/NSAIDs, blood-thinning agents, vitamins & supplements SEVEN DAYS prior to surgery. I understand a pre-operative phone call will be made to verify my surgery time. In the event that I am not available, I give permission for a message to be left on my answering service and/or with another person?   yes         ___________________      __________   _________    (Signature of Patient)             (Witness)                (Date and Time)

## 2023-01-05 LAB
ATRIAL RATE: 68 BPM
CALCULATED P AXIS, ECG09: 60 DEGREES
CALCULATED R AXIS, ECG10: 0 DEGREES
CALCULATED T AXIS, ECG11: 24 DEGREES
DIAGNOSIS, 93000: NORMAL
P-R INTERVAL, ECG05: 142 MS
Q-T INTERVAL, ECG07: 362 MS
QRS DURATION, ECG06: 82 MS
QTC CALCULATION (BEZET), ECG08: 384 MS
VENTRICULAR RATE, ECG03: 68 BPM

## 2023-01-17 NOTE — PERIOP NOTES
[facility]  Preoperative Instructions    Surgery Date January 24          Time of Arrival to be called  Contact#[phone]    1. On the day of your surgery, please report to the Surgical Services Registration Desk and sign in at your designated time. The Surgery Center is located to the right of the Emergency Room. 2. You must have someone with you to drive you home. You should not drive a car for 24 hours following surgery. Please make arrangements for a friend or family member to stay with you for the first 24 hours after your surgery. 3. Do not have anything to eat or drink (including water, gum, mints, coffee, juice) after midnight January 23? [place] ? This may not apply to medications prescribed by your physician. ?(Please note below the special instructions with medications to take the morning of your procedure.)    4. We recommend you do not drink any alcoholic beverages for 24 hours before and after your surgery. 5. Contact your surgeons office for instructions on the following medications: non-steroidal anti-inflammatory drugs (i.e. Advil, Aleve), vitamins, and supplements. (Some surgeons will want you to stop these medications prior to surgery and others may allow you to take them)  **If you are currently taking Plavix, Coumadin, Aspirin and/or other blood-thinning agents, contact your surgeon for instructions. ** Your surgeon will partner with the physician prescribing these medications to determine if it is safe to stop or if you need to continue taking. Please do not stop taking these medications without instructions from your surgeon    6. Wear comfortable clothes. Wear glasses instead of contacts. Do not bring any money or jewelry. Please bring picture ID, insurance card, and any prearranged co-payment or hospital payment. Do not wear make-up, particularly mascara the morning of your surgery.   Do not wear nail polish, particularly if you are having foot /hand surgery. Wear your hair loose or down, no ponytails, buns, sol pins or clips. All body piercings must be removed. Please shower with antibacterial soap for three consecutive days before and on the morning of surgery, but do not apply any lotions, powders or deodorants after the shower on the day of surgery. Please use a fresh towels after each shower. Please sleep in clean clothes and change bed linens the night before surgery. Please do not shave for 48 hours prior to surgery. Shaving of the face is acceptable. 7. You should understand that if you do not follow these instructions your surgery may be cancelled. If your physical condition changes (I.e. fever, cold or flu) please contact your surgeon as soon as possible. 8. It is important that you be on time. If a situation occurs where you may be late, please call [phone] (OR Holding Area). 9. If you have any questions and or problems, please call [phone] (Pre-admission Testing). 10. Your surgery time may be subject to change. You will receive a phone call the evening prior if your time changes. 11.  If having outpatient surgery, you must have someone to drive you here, stay with you during the duration of your stay, and to drive you home at time of discharge. TAKE ALL MEDICATIONS DAY OF SURGERY EXCEPT:    Follow your physician/surgeon instructions for holding all non-steroidal anti-inflammatory drugs/NSAIDs, blood-thinning agents, vitamins & supplements SEVEN DAYS prior to surgery. Suggested he reach out to his cardiologist with report of not taking aspirin since 1/3    I understand a pre-operative phone call will be made to verify my surgery time. In the event that I am not available, I give permission for a message to be left on my answering service and/or with another person?   yes         ___________________      __________   _________    (Signature of Patient)             (Witness)                (Date and Time)

## 2023-01-23 NOTE — PERIOP NOTES
Called patient time of arrival and he stated hes been nauseated, no vomiting or fever. I left a message for Dr. Sanam Felix nurse to return my call. Ramez called back and will let Dr. Nguyễn Guerrero know of above and will call back with any changes.

## 2023-01-24 ENCOUNTER — ANESTHESIA EVENT (OUTPATIENT)
Dept: SURGERY | Age: 68
End: 2023-01-24
Payer: MEDICARE

## 2023-01-24 ENCOUNTER — ANESTHESIA (OUTPATIENT)
Dept: SURGERY | Age: 68
End: 2023-01-24
Payer: MEDICARE

## 2023-01-24 ENCOUNTER — HOSPITAL ENCOUNTER (OUTPATIENT)
Age: 68
Setting detail: OUTPATIENT SURGERY
Discharge: HOME | End: 2023-01-24
Attending: UROLOGY | Admitting: UROLOGY
Payer: MEDICARE

## 2023-01-24 ENCOUNTER — APPOINTMENT (OUTPATIENT)
Dept: GENERAL RADIOLOGY | Age: 68
End: 2023-01-24
Attending: UROLOGY
Payer: MEDICARE

## 2023-01-24 VITALS
BODY MASS INDEX: 27.79 KG/M2 | HEIGHT: 67 IN | HEART RATE: 65 BPM | OXYGEN SATURATION: 100 % | DIASTOLIC BLOOD PRESSURE: 69 MMHG | SYSTOLIC BLOOD PRESSURE: 134 MMHG | TEMPERATURE: 97.7 F | RESPIRATION RATE: 17 BRPM | WEIGHT: 177.03 LBS

## 2023-01-24 PROCEDURE — 77030021707 HC SET IRR FLD WRMR 3M -B: Performed by: UROLOGY

## 2023-01-24 PROCEDURE — 76060000032 HC ANESTHESIA 0.5 TO 1 HR: Performed by: UROLOGY

## 2023-01-24 PROCEDURE — 74011000250 HC RX REV CODE- 250: Performed by: REGISTERED NURSE

## 2023-01-24 PROCEDURE — 2709999900 HC NON-CHARGEABLE SUPPLY: Performed by: UROLOGY

## 2023-01-24 PROCEDURE — 74011000636 HC RX REV CODE- 636: Performed by: UROLOGY

## 2023-01-24 PROCEDURE — 77030008684 HC TU ET CUF COVD -B: Performed by: ANESTHESIOLOGY

## 2023-01-24 PROCEDURE — 74011250636 HC RX REV CODE- 250/636: Performed by: REGISTERED NURSE

## 2023-01-24 PROCEDURE — C1758 CATHETER, URETERAL: HCPCS | Performed by: UROLOGY

## 2023-01-24 PROCEDURE — 76010000138 HC OR TIME 0.5 TO 1 HR: Performed by: UROLOGY

## 2023-01-24 PROCEDURE — 74420 UROGRAPHY RTRGR +-KUB: CPT

## 2023-01-24 PROCEDURE — 74011250637 HC RX REV CODE- 250/637: Performed by: UROLOGY

## 2023-01-24 PROCEDURE — 74011250636 HC RX REV CODE- 250/636: Performed by: ANESTHESIOLOGY

## 2023-01-24 PROCEDURE — 77030019908 HC STETH ESOPH SIMS -A: Performed by: ANESTHESIOLOGY

## 2023-01-24 PROCEDURE — 74011250636 HC RX REV CODE- 250/636: Performed by: UROLOGY

## 2023-01-24 PROCEDURE — 76210000016 HC OR PH I REC 1 TO 1.5 HR: Performed by: UROLOGY

## 2023-01-24 PROCEDURE — 77030026438 HC STYL ET INTUB CARD -A: Performed by: ANESTHESIOLOGY

## 2023-01-24 PROCEDURE — 74011000250 HC RX REV CODE- 250: Performed by: NURSE ANESTHETIST, CERTIFIED REGISTERED

## 2023-01-24 PROCEDURE — 74011000250 HC RX REV CODE- 250: Performed by: UROLOGY

## 2023-01-24 RX ORDER — FENTANYL CITRATE 50 UG/ML
INJECTION, SOLUTION INTRAMUSCULAR; INTRAVENOUS AS NEEDED
Status: DISCONTINUED | OUTPATIENT
Start: 2023-01-24 | End: 2023-01-24 | Stop reason: HOSPADM

## 2023-01-24 RX ORDER — ROCURONIUM BROMIDE 10 MG/ML
INJECTION, SOLUTION INTRAVENOUS AS NEEDED
Status: DISCONTINUED | OUTPATIENT
Start: 2023-01-24 | End: 2023-01-24 | Stop reason: HOSPADM

## 2023-01-24 RX ORDER — ONDANSETRON 2 MG/ML
4 INJECTION INTRAMUSCULAR; INTRAVENOUS AS NEEDED
Status: DISCONTINUED | OUTPATIENT
Start: 2023-01-24 | End: 2023-01-24 | Stop reason: HOSPADM

## 2023-01-24 RX ORDER — SODIUM CHLORIDE 0.9 % (FLUSH) 0.9 %
5-40 SYRINGE (ML) INJECTION AS NEEDED
Status: DISCONTINUED | OUTPATIENT
Start: 2023-01-24 | End: 2023-01-24 | Stop reason: HOSPADM

## 2023-01-24 RX ORDER — DEXAMETHASONE SODIUM PHOSPHATE 4 MG/ML
INJECTION, SOLUTION INTRA-ARTICULAR; INTRALESIONAL; INTRAMUSCULAR; INTRAVENOUS; SOFT TISSUE AS NEEDED
Status: DISCONTINUED | OUTPATIENT
Start: 2023-01-24 | End: 2023-01-24 | Stop reason: HOSPADM

## 2023-01-24 RX ORDER — LIDOCAINE HYDROCHLORIDE 20 MG/ML
INJECTION, SOLUTION EPIDURAL; INFILTRATION; INTRACAUDAL; PERINEURAL AS NEEDED
Status: DISCONTINUED | OUTPATIENT
Start: 2023-01-24 | End: 2023-01-24 | Stop reason: HOSPADM

## 2023-01-24 RX ORDER — EPHEDRINE SULFATE/0.9% NACL/PF 50 MG/5 ML
SYRINGE (ML) INTRAVENOUS AS NEEDED
Status: DISCONTINUED | OUTPATIENT
Start: 2023-01-24 | End: 2023-01-24 | Stop reason: HOSPADM

## 2023-01-24 RX ORDER — MIDAZOLAM HYDROCHLORIDE 1 MG/ML
INJECTION, SOLUTION INTRAMUSCULAR; INTRAVENOUS AS NEEDED
Status: DISCONTINUED | OUTPATIENT
Start: 2023-01-24 | End: 2023-01-24 | Stop reason: HOSPADM

## 2023-01-24 RX ORDER — ONDANSETRON 2 MG/ML
INJECTION INTRAMUSCULAR; INTRAVENOUS AS NEEDED
Status: DISCONTINUED | OUTPATIENT
Start: 2023-01-24 | End: 2023-01-24 | Stop reason: HOSPADM

## 2023-01-24 RX ORDER — PROPOFOL 10 MG/ML
INJECTION, EMULSION INTRAVENOUS AS NEEDED
Status: DISCONTINUED | OUTPATIENT
Start: 2023-01-24 | End: 2023-01-24 | Stop reason: HOSPADM

## 2023-01-24 RX ORDER — SUCCINYLCHOLINE CHLORIDE 20 MG/ML
INJECTION INTRAMUSCULAR; INTRAVENOUS AS NEEDED
Status: DISCONTINUED | OUTPATIENT
Start: 2023-01-24 | End: 2023-01-24 | Stop reason: HOSPADM

## 2023-01-24 RX ORDER — SODIUM CHLORIDE 0.9 % (FLUSH) 0.9 %
5-40 SYRINGE (ML) INJECTION EVERY 8 HOURS
Status: DISCONTINUED | OUTPATIENT
Start: 2023-01-24 | End: 2023-01-24 | Stop reason: HOSPADM

## 2023-01-24 RX ORDER — PHENAZOPYRIDINE HYDROCHLORIDE 200 MG/1
200 TABLET, FILM COATED ORAL
Qty: 10 TABLET | Refills: 0 | Status: SHIPPED | OUTPATIENT
Start: 2023-01-24 | End: 2023-01-27

## 2023-01-24 RX ORDER — FENTANYL CITRATE 50 UG/ML
25 INJECTION, SOLUTION INTRAMUSCULAR; INTRAVENOUS
Status: DISCONTINUED | OUTPATIENT
Start: 2023-01-24 | End: 2023-01-24 | Stop reason: HOSPADM

## 2023-01-24 RX ORDER — SODIUM CHLORIDE, SODIUM LACTATE, POTASSIUM CHLORIDE, CALCIUM CHLORIDE 600; 310; 30; 20 MG/100ML; MG/100ML; MG/100ML; MG/100ML
25 INJECTION, SOLUTION INTRAVENOUS CONTINUOUS
Status: DISCONTINUED | OUTPATIENT
Start: 2023-01-24 | End: 2023-01-24 | Stop reason: HOSPADM

## 2023-01-24 RX ORDER — HYDROMORPHONE HYDROCHLORIDE 1 MG/ML
0.2 INJECTION, SOLUTION INTRAMUSCULAR; INTRAVENOUS; SUBCUTANEOUS
Status: DISCONTINUED | OUTPATIENT
Start: 2023-01-24 | End: 2023-01-24 | Stop reason: HOSPADM

## 2023-01-24 RX ORDER — CEFUROXIME AXETIL 500 MG/1
500 TABLET ORAL 2 TIMES DAILY
Qty: 6 TABLET | Refills: 0 | Status: SHIPPED | OUTPATIENT
Start: 2023-01-24 | End: 2023-01-27

## 2023-01-24 RX ADMIN — FENTANYL CITRATE 100 MCG: 50 INJECTION, SOLUTION INTRAMUSCULAR; INTRAVENOUS at 14:58

## 2023-01-24 RX ADMIN — SODIUM CHLORIDE, POTASSIUM CHLORIDE, SODIUM LACTATE AND CALCIUM CHLORIDE 25 ML/HR: 600; 310; 30; 20 INJECTION, SOLUTION INTRAVENOUS at 13:57

## 2023-01-24 RX ADMIN — PROPOFOL 200 MG: 10 INJECTION, EMULSION INTRAVENOUS at 14:58

## 2023-01-24 RX ADMIN — LIDOCAINE HYDROCHLORIDE 60 MG: 20 INJECTION, SOLUTION EPIDURAL; INFILTRATION; INTRACAUDAL; PERINEURAL at 14:58

## 2023-01-24 RX ADMIN — ROCURONIUM BROMIDE 10 MG: 10 INJECTION INTRAVENOUS at 14:58

## 2023-01-24 RX ADMIN — MIDAZOLAM HYDROCHLORIDE 2 MG: 1 INJECTION, SOLUTION INTRAMUSCULAR; INTRAVENOUS at 14:49

## 2023-01-24 RX ADMIN — ONDANSETRON HYDROCHLORIDE 4 MG: 2 INJECTION, SOLUTION INTRAMUSCULAR; INTRAVENOUS at 15:16

## 2023-01-24 RX ADMIN — SUCCINYLCHOLINE CHLORIDE 120 MG: 20 INJECTION, SOLUTION INTRAMUSCULAR; INTRAVENOUS at 14:59

## 2023-01-24 RX ADMIN — Medication 10 MG: at 15:06

## 2023-01-24 RX ADMIN — DEXAMETHASONE SODIUM PHOSPHATE 4 MG: 4 INJECTION, SOLUTION INTRAMUSCULAR; INTRAVENOUS at 15:16

## 2023-01-24 RX ADMIN — WATER 2 G: 1 INJECTION INTRAMUSCULAR; INTRAVENOUS; SUBCUTANEOUS at 14:49

## 2023-01-24 RX ADMIN — Medication 20 MG: at 15:08

## 2023-01-24 RX ADMIN — Medication 3 AMPULE: at 13:57

## 2023-01-24 NOTE — BRIEF OP NOTE
Brief Postoperative Note    Patient: Rossy Baltazar  YOB: 1955  MRN: [id number]    Date of Procedure: 1/24/2023     Pre-Op Diagnosis: Bladder mass [N32.89]    Post-Op Diagnosis: normal exam    Procedure(s):  CYSTOSCOPY AND BILATERAL retrograde pyelograms, EUA    Surgeon(s):  Edwinna Sandifer, MD    Surgical Assistant: None    Anesthesia: General     Estimated Blood Loss (mL): Minimal    Complications: None    Specimens: * No specimens in log *     Implants: * No implants in log *    Drains: * No LDAs found *    Findings: CARMITA    Electronically Signed by Darinel Quezada MD on 1/24/2023 at 3:21 PM

## 2023-01-24 NOTE — ANESTHESIA PREPROCEDURE EVALUATION
Relevant Problems   RESPIRATORY SYSTEM   (+) Pneumonia      NEUROLOGY   (+) Psychiatric disorder      CARDIOVASCULAR   (+) HTN (hypertension)      GASTROINTESTINAL   (+) Liver disease       Anesthetic History   No history of anesthetic complications            Review of Systems / Medical History  Patient summary reviewed, nursing notes reviewed and pertinent labs reviewed    Pulmonary          Smoker (Smoker - 1 ppd x 53 years)         Neuro/Psych     seizures  CVA  TIA and psychiatric history (Depression and anxiety)    Comments: No residual neurologic deficits Cardiovascular    Hypertension          CAD, cardiac stents and hyperlipidemia    Exercise tolerance: >4 METS  Comments: ECG (1/4/23): Normal sinus rhythm   Inferior infarct (cited on or before 09-APR-2019)   When compared with ECG of 06-JAN-2022 17:30,   Nonspecific T wave abnormality has replaced inverted T waves in Inferior   leads     TTE (10/2/17): Left ventricle: Systolic function was normal. Ejection fraction was  estimated in the range of 60 % to 65 %. There were no regional wall motion  abnormalities. Wall thickness was mildly increased. GI/Hepatic/Renal     GERD        Pertinent negatives: No liver disease  Comments: Hx Hepatitis at age 16 Endo/Other        Arthritis    Comments:  Thrombocytopenia (Platelets = 260W on 3/0/07) Other Findings   Comments: Bladder mass     Chronic pain         Physical Exam    Airway  Mallampati: II  TM Distance: > 6 cm  Neck ROM: normal range of motion        Cardiovascular  Regular rate and rhythm,  S1 and S2 normal,  no murmur, click, rub, or gallop  Rhythm: regular  Rate: normal         Dental  No notable dental hx       Pulmonary  Breath sounds clear to auscultation               Abdominal  GI exam deferred       Other Findings            Anesthetic Plan    ASA: 3  Anesthesia type: general    Monitoring Plan: BIS      Induction: Intravenous  Anesthetic plan and risks discussed with: Patient

## 2023-01-24 NOTE — ANESTHESIA POSTPROCEDURE EVALUATION
Procedure(s):  BILATERAL CYSTOSCOPY AND PYEOGRAM.    general    Anesthesia Post Evaluation        Patient location during evaluation: PACU  Note status: Adequate. Level of consciousness: responsive to verbal stimuli and sleepy but conscious  Pain management: satisfactory to patient  Airway patency: patent  Anesthetic complications: no  Cardiovascular status: acceptable  Respiratory status: acceptable  Hydration status: acceptable  Comments: +Post-Anesthesia Evaluation and Assessment    Patient: Christina Byrne MRN: [id number]  SSN: [id number]   YOB: 1955  Age: 79 y.o. Sex: male      Cardiovascular Function/Vital Signs    /79   Pulse 66   Temp 36.5 °C (97.7 °F)   Resp 13   Ht 5' 7\" (1.702 m)   Wt 80.3 kg (177 lb 0.5 oz)   SpO2 99%   BMI 27.73 kg/m²     Patient is status post Procedure(s):  BILATERAL CYSTOSCOPY AND PYEOGRAM.    Nausea/Vomiting: Controlled. Postoperative hydration reviewed and adequate. Pain:  Pain Scale 1: Numeric (0 - 10) (01/24/23 1550)  Pain Intensity 1: 0 (01/24/23 1550)   Managed. Neurological Status:   Neuro (WDL): Exceptions to WDL (01/24/23 1536)   At baseline. Mental Status and Level of Consciousness: Arousable. Pulmonary Status:   O2 Device: None (Room air) (01/24/23 1620)   Adequate oxygenation and airway patent. Complications related to anesthesia: None    Post-anesthesia assessment completed. No concerns. Signed By: Sammi Carlson MD    1/24/2023  Post anesthesia nausea and vomiting:  controlled      INITIAL Post-op Vital signs:   Vitals Value Taken Time   /69 01/24/23 1630   Temp 36.5 °C (97.7 °F) 01/24/23 1536   Pulse 62 01/24/23 1635   Resp 14 01/24/23 1635   SpO2 99 % 01/24/23 1635   Vitals shown include unvalidated device data.

## 2023-01-24 NOTE — DISCHARGE INSTRUCTIONS
restart aspirin in 3 days  Cystoscopy: What to Expect at [address]     A cystoscopy is a procedure that lets a doctor look inside of the bladder and the urethra. The urethra is the tube that carries urine from the bladder to outside the body. The doctor uses a thin, lighted tool called a cystoscope. Your bladder is filled with fluid. This stretches the bladder so that your doctor can look closely at the inside of your bladder. After the cystoscopy, your urethra may be sore at first, and it may burn when you urinate for the first few days after the procedure. You may feel the need to urinate more often, and your urine may be pink. These symptoms should get better in 1 or 2 days. You will probably be able to go back to most of your usual activities in 1 or 2 days. This care sheet gives you a general idea about how long it will take for you to recover. But each person recovers at a different pace. Follow the steps below to get better as quickly as possible. How can you care for yourself at home? Activity    Rest when you feel tired. Getting enough sleep will help you recover. Try to walk each day. Start by walking a little more than you did the day before. Bit by bit, increase the amount you walk. Walking boosts blood flow and helps prevent pneumonia and constipation. Avoid strenuous activities, such as bicycle riding, jogging, weight lifting, or aerobic exercise, until your doctor says it is okay. Ask your doctor when you can drive again. Most people are able to return to work within 1 or 2 days after the procedure. You may shower and take baths as usual.     Ask your doctor when it is okay for you to have sex. Diet    You can eat your normal diet. If your stomach is upset, try bland, low-fat foods like plain rice, broiled chicken, toast, and yogurt. Drink plenty of fluids (unless your doctor tells you not to). Medicines    Take pain medicines exactly as directed.   If the doctor gave you a prescription medicine for pain, take it as prescribed. If you are not taking a prescription pain medicine, ask your doctor if you can take an over-the-counter medicine. If you think your pain medicine is making you sick to your stomach: Take your medicine after meals (unless your doctor has told you not to). Ask your doctor for a different pain medicine. If your doctor prescribed antibiotics, take them as directed. Do not stop taking them just because you feel better. You need to take the full course of antibiotics. Follow-up care is a key part of your treatment and safety. Be sure to make and go to all appointments, and call your doctor if you are having problems. It's also a good idea to know your test results and keep a list of the medicines you take. When should you call for help? Call 911 anytime you think you may need emergency care. For example, call if:    You passed out (lost consciousness). You have severe trouble breathing. You have sudden chest pain and shortness of breath, or you cough up blood. You have severe belly pain. Call your doctor now or seek immediate medical care if:    You are sick to your stomach or cannot keep fluids down. Your urine is still red or you see blood clots after you have urinated several times. You have trouble passing urine or stool, especially if you have pain or swelling in your lower belly. You have signs of a blood clot, such as:  Pain in your calf, back of the knee, thigh, or groin. Redness and swelling in your leg or groin. You develop a fever or severe chills. You have pain in your back just below your rib cage. This is called flank pain. Watch closely for changes in your health, and be sure to contact your doctor if:    You have pain or burning when you urinate. A burning feeling is normal for a day or two after the test, but call if it does not get better.      You have a frequent urge to urinate but can pass only small amounts of urine. Your urine is pink, red, or cloudy, or smells bad. It is normal for the urine to have a pinkish color for a few days after the test, but call if it does not get better. Where can you learn more? Go to http://www.gray.com/  Enter C842 in the search box to learn more about \"Cystoscopy: What to Expect at Home. \"  Current as of: June 16, 2022               Content Version: 13.4  © 2006-2022 [employer]. Care instructions adapted under license by [employer] (which disclaims liability or warranty for this information). If you have questions about a medical condition or this instruction, always ask your healthcare professional. [place] [address] any warranty or liability for your use of this information. DISCHARGE SUMMARY from Nurse    PATIENT INSTRUCTIONS:    After general anesthesia or intravenous sedation, for 24 hours or while taking prescription narcotics:    Have someone responsible help you with your care  Limit your activities  Do not drive and operate hazardous machinery  Do not make important personal, legal or business decisions  Do not drink alcoholic beverages  If you have not urinated within 8 hours after discharge, please contact your surgeon on call  Resume your medications unless otherwise instructed    From general anesthesia, intravenous sedation, or while taking prescription narcotics, you may experience:    Drowsiness, dizziness, sleepiness, or confusion  Difficulty remembering or delayed reaction times  Difficulty with your balance, especially while walking, move slowly and carefully, do not make sudden position changes  Difficulty focusing or blurred vision    You may not be aware of slight changes in your behavior and/or your reaction time because of the medication used during and after your procedure.     Report the following to your surgeon:  Excessive pain, swelling, redness or odor of or around the surgical area  Temperature over 100.5  Nausea and vomiting lasting longer than 4 hours or if unable to take medications  Any signs of decreased circulation or nerve impairment to extremity: change in color, persistent numbness, tingling, coldness or increase pain  Any questions or concerns         IF YOU REPORT TO AN EMERGENCY ROOM, DOCTOR'S OFFICE OR HOSPITAL WITHIN 24 HOURS AFTER YOUR PROCEDURE, BRING THIS SHEET AND YOUR AFTER VISIT SUMMARY WITH YOU AND GIVE IT TO THE PHYSICIAN OR NURSE ATTENDING YOU. These are general instructions for a healthy lifestyle (if applicable): No smoking/ No tobacco products/ Avoid exposure to secondhand smoke  Surgeon General's Warning:  Quitting smoking now greatly reduces serious risk to your health. Obesity, smoking, and sedentary lifestyle greatly increases your risk for illness    A healthy diet, regular physical exercise & weight monitoring are important for maintaining a healthy lifestyle    You may be retaining fluid if you have a history of heart failure or if you experience any of the following symptoms:  Weight gain of 3 pounds or more overnight or 5 pounds in a week, increased swelling in our hands or feet or shortness of breath while lying flat in bed. Please call your doctor as soon as you notice any of these symptoms; do not wait until your next office visit. A common side effect of anesthesia following surgery is nausea and/or vomiting. In order to decrease symptoms, it is wise to avoid foods that are high in fat, greasy foods, milk products, and spicy foods for the first 24 hours. Acceptable foods for the first 24 hours following surgery include but are not limited to:    soup  broth  toast   crackers   applesauce  bananas   mashed potatoes,  soft or scrambled eggs  oatmeal  jello    It is important to eat when taking your pain medication. This will help to prevent nausea.  If possible, please try to time your meals with your medications. It is very important to stay hydrated following surgery. Sip fluids frequently while awake. Avoid acidic drinks such as citrus juices and soda for 24 hours. Carbonated beverages may cause bloating and gas. Acceptable fluids include:    water (flavor packets may add variety)  coffee or tea (in moderation)  Gatorade  Yandel-Aid  apple juice  cranberry juice    You are encouraged to cough and deep breathe every hour when awake. This will help to prevent respiratory complications following anesthesia. You may want to hug a pillow when coughing and sneezing to add additional support to the surgical area and to decrease discomfort if you had abdominal or chest surgery. If you are discharged home with support stockings, you may remove them after 24 hours. Support stockings are used to help prevent blood clots in the legs following surgery. TO PREVENT AN INFECTION      [address] YOUR HANDS    To prevent infection, good handwashing is the most important thing you or your caregiver can do. Wash your hands with soap and water or use the hand  we gave you before you touch any wounds. SHOWER    Use the antibacterial soap we gave you when you take a shower. Shower with this soap until your wounds are healed. To reach all areas of your body, you may need someone to help you. Dont forget to clean your belly button with every shower. USE CLEAN SHEETS    Use freshly cleaned sheets on your bed after surgery. To keep the surgery site clean, do not allow pets to sleep with you while your wound is still healing. STOP SMOKING    Stop smoking, or at least cut back on smoking    Smoking slows your healing. CONTROL YOUR BLOOD SUGAR    High blood sugars slow wound healing. If you are diabetic, control your blood sugar levels before and after your surgery.     Please take time to review all of your Home Care Instructions and Medication Information sheets provided in your discharge packet. If you have any questions, please contact your surgeon's office. Thank you. The discharge information has been reviewed with the patient and instruction recipient. The patient and instruction recipient verbalized understanding. Discharge medications reviewed with the patient and instruction recipient and appropriate educational materials and side effects teaching were provided. Please provide this summary of care documentation to your next provider.

## 2023-01-24 NOTE — PERIOP NOTES
[address] [facility] from Operating Room to PACU    Report received from ANAY Hanson RN and Catie Pacheco CRNA regarding Manuel Pendleton. Surgeon(s):  Leonidas Saini MD  And Procedure(s) (LRB):  BILATERAL CYSTOSCOPY AND PYEOGRAM (Bilateral)  confirmed   with allergies discussed. Anesthesia type, drugs, patient history, complications, estimated blood loss, vital signs, intake and output, and last pain medication, lines, reversal medications, and temperature were reviewed. 1620 Discharge brochure provided; Reviewed DC instructions with patient and his sister, Nico Antonio. Opportunity for questions and clarifications was provided; verbalized understanding. Follow-up appointments reviewed/written for patient. Pt stable and ambulatory for discharge; Patient's sister, Priyank Llanes, to provide transportation to . Clarified all personal belongings sent with patient. 1649 IV removed (without difficulty/pt tolerated well) Telemetry discontinued. Assisted patient with getting dressed. 1705 Patient discharged home with his sister. No concerns voiced at time of discharge. Patient discharged from PACU.

## 2023-01-24 NOTE — PERIOP NOTES
1332 - PT DENIES FEVER, COLD, COUGH, SOB, N/V, DIARRHEA. ..... PRE-OP TCHING DONE - PT VERBALIZES UNDERSTANDING. STRETCHER IN LOWEST POSITION, CB IN PLACE AND SR UP X2.

## 2023-01-24 NOTE — H&P
Austen Small is a 79year old male who presents today for \"BPH, incontinence\". He returns for follow-up. Renal US in June 2021 showed no stones or hydronephrosis. Prostate volume of 36g. 3.1 cm abdominal aortic aneurysm. Vascular surgery consultation with a CT angiogram is suggested for more complete evaluation. He is s/p cystourethroscopy in July 2021, which showed moderate prostatic obstruction but otherwise unremarkable. He saw Dr. Nicol Hewitt in March 2022 for a possible UTI. He was empirically treated with Augmentin. Urine culture was positive for E. coli. He returns today for a follow up. PSA today is elevated at 2.65 as compared to his previous value of 0.562 on 9/12/22. He continues to use a couple of pads per day for incontinence. He previously tried Flomax, but discontinued it secondary to ineffectiveness. He continues on 50mg Myrbetriq. He is also on 100mg sildenafil for erectile dysfunction. He has an inguinal hernia and I previously referred him to Dr. Oma Lewis, but he has not gotten this repaired yet. UA today shows 3-5 WBCs and 6-10 RBCs. Cystoscopy today shows diffuse erythema, otherwise unremarkable. He denies any gross hematuria, dysuria, pain, fevers, or chills. PAST MEDICAL HISTORY:    Allergies: * LATEX (Moderate)  * codeine (Mild)  * morphine (Mild)  DENIES: Shellfish, X-Ray Dye, Iodine.      Medications: Viagra 100 mg tablet (sildenafil) Take 1/2-1 tablet by mouth as directed 1 hour prior to intercourse  cefuroxime axetil 500 mg tablet (cefuroxime axetil) Take 1 tablet by mouth twice a day   terbinafine HCl 250 mg tablet (terbinafine hcl)   Myrbetriq 50 mg tablet extended release 24 hr (mirabegron) Take 1 tablet by mouth once a day   meloxicam 15 mg tablet (meloxicam)   lisinopril 20 mg tablet (lisinopril) Take 1 tablet by mouth once a day   metoprolol tartrate 25 mg tablet (metoprolol tartrate) Take 1 tablet by mouth once a day   Xanax 2 mg tablet (alprazolam) Take 1 tablet by mouth once a day as needed   pantoprazole 40 mg tablet,delayed release (DR/EC) (pantoprazole) Take 1 tablet by mouth once a day   * aspirin 81 MG Delayed Release Oral Tablet [Miniprin] Take 1 tablet by mouth once a day   Ambien 10 mg tablet (zolpidem) Take 1 tablet by mouth once a day   hydromorphone 4 mg tablet (hydromorphone) Take 1 tablet by mouth every four hours as needed   melatonin 5 mg tablet (melatonin)   Narcan 4 mg/actuation spray,non-aerosol (naloxone)   cholecalciferol (vitamin D3) 25 mcg (1,000 unit) capsule (cholecalciferol (vitamin d3))   Colace 100 mg capsule (docusate sodium)   atorvastatin 20 mg tablet (atorvastatin)     Problems: Elevated PSA (ICD-790.93) (ITY32-M12.20)  Inguinal hernia (ICD-550.90) (AVR49-H38.90)  UTI (ICD-599.0) (NSO72-I15.0)  Dysuria (ICD-788.1) (CQL71-Y46.0)  Erectile dysfunction (ICD-607.84) (WJQ11-C99.9)  Aortic aneurysm (ICD-441.9) (ZSA02-E90. 9)  Tobacco abuse (ICD-305.1) (SQN15-E68.200)  History of tobacco use (ICD-V15.82) (NYA07-E50.891)  Urge incontinence (ICD-788.31) (XBM04-L08.41)  Nocturia (ICD-788.43) (VEI46-N56.1)  BPH without obstruction (ICD-600.0) (VHY13-S07.0)  Seborrhea (ICD-706.3) (ODW75-K88.9)  Light cigarette smoker,  1-9 cigs/day (ICD-305.1) (KAM28-L51.210)  Insomnia due to medical condition classified elsewhere (ICD-327.01) (RHE22-H95.01)  Generalized anxiety disorder (ICD-300.02) (RHL27-F54.1)  Gastroesophageal reflux disease (ICD-530.81) (SIN72-S44. 9)  Chronic low back pain (ICD-724.2) (ZLJ96-F30.5)  BPH with obstruction (ICD-600.0) (EKC12-E74.1)  Benign essential hypertension (ICD-401.1) (GLC77-K06)    Illnesses: High Blood Pressure. DENIES: Heart Disease, Pacemaker/Defibrillator, Lung Disease, Diabetes, Bowel Problems, Stroke/Seizure, Kidney Problems, Bleeding Problems, HIV, Hepatitis, or Cancer. Surgeries: DENIES pertinent  surgeries      Family History: DENIES: Prostate cancer, Kidney cancer, Kidney disease, Kidney stones. Social History: Retired. Single. Smoking status: former smoker. Does not drink alcohol. System Review: Admits to: Dry Skin and Leg Swelling. DENIES: Unexplained Weight Loss, Dry Eyes, Dry Mouth, Shortness of Breath, Constipation, Involuntary Urine Loss, Lower Extremity Weakness, Difficulty Walking, Psychiatric Problems, Impaired Sex Drive, Easy Bleeding, Rash. EXAMINATION: Appearance: well-developed NAD Respiratory Effort: breathing easily Skin Inspection: warm and dry Orientation: oriented to person; time and place Mood/Affect: normal       URINALYSIS from Voided specimen  Urine Dip: pH: 6.5, Bld: +++, LE: Trace, Nit: Neg, Prot: Neg, Ket: Neg, Gluc: Neg  Urine Micro: WBC: 3-5, RBC: 6-10, Bacteria: 0    CYSTOSCOPY: The patient was prepped and draped in a sterile fashion. FINDINGS: URETHRA: normal without strictures or lesions. PROSTATE: lateral lobe hypertrophy. There is no median prostatic lobe. The prostatic urethra shows mild obstruction. BLADDER: abnormal: The bladder has Diffuse erythema with no zoë masses. The trigone and ureteral orifices are normal with clear efflux bilaterally. Gentamicin 80mg placed within the bladder. PSA HISTORY  0.562 ng/ml on 09/12/2022    IMPRESSION:    1. INGUINAL HERNIA (KTS55-U00.90) - Unchanged: Incarceration precautions discussed referral to Dr. Ariel Webber. 2. UTI (LLT59-Q32.0) - Improved: Urine for culture. 3. BPH WITHOUT OBSTRUCTION (QSU49-N60.0) - Unchanged    4. HISTORY OF TOBACCO USE (CUO78-E32.891) - Unchanged    5. URGE INCONTINENCE (KTJ47-Z83.41) - Deteriorated    6. ELEVATED PSA (DRL99-E47.20) - New    PLAN: Because of his history of tobacco abuse and irritative voiding symptoms as well as a diffuse erythema seen within the bladder we need to rule out carcinoma in situ. We will send both a urine culture as well as a urine cytology. Schedule him for an anesthetic cystoscopic examination about retrograde pyelograms and bladder biopsies.   We will follow-up with a repeat PSA at some point and consider an MRI. The risks of a cystoscopy by retrograde pyelograms was discussed in detail with the patient including not limited to bleeding and infection, he understands and wished to proceed. All questions and concerns were addressed prior to the patient leaving the clinic. Patient understands and agrees with the plan.

## 2023-01-25 NOTE — OP NOTES
[address]  OPERATIVE REPORT    Name:  Kaitlin Valdes  MR#:  [id number]  :  1955  ACCOUNT #:  [de-identified]  DATE OF SERVICE:  2023    PREOPERATIVE DIAGNOSIS:  Bladder lesion with history of tobacco abuse and irritating voiding symptoms. POSTOPERATIVE DIAGNOSIS:  Normal cystoscopic examination and retrograde pyelogram.    PROCEDURE PERFORMED:  Anesthetic cystoscopic examination, bilateral retrograde pyelogram, exam under anesthesia. SURGEON:  Galina Cardoza MD    ASSISTANT:  none    ANESTHESIA:  General.    COMPLICATIONS:  None. SPECIMENS REMOVED:  None. IMPLANTS:  None. ESTIMATED BLOOD LOSS:  None. PROCEDURE:  After obtaining informed consent, the patient received preoperative antibiotics, the patient was placed on the operating table in supine position. After adequate induction of general anesthetic, he was placed in lithotomy position, all pressure points were carefully padded. The penis was prepped and draped in sterile fashion. A full time-out was accomplished. A 21-[place] cystoscope was advanced under direct visualization into the bladder and the bladder was surveyed. A moderately obstructive prostate gland with no medial lobe component. Urethra was free of disease. Using both the 30 and 70 degree lens, I surveyed the entire bladder and was found to be free of any disease. Both ureteral orifices were orthotopic in nature effluxing clear urine. I then did bilateral retrograde pyelograms, we can see the contrast in each system using a TigerTail that was significantly in the pelvicalyceal ureteric system bilaterally with a little bit of obscured view because of his back hardware, however there was prompt drainage, no hydronephrosis and no filling defects noted. The bladder was then drained, the scope removed. The digital rectal exam revealed no rectal masses and a benign 30 g prostate. He tolerated the procedure well, no complications.       KEENAN HERNANDEZ MD ANA [id number]  D:  01/24/2023 15:26  T:  01/24/2023 22:37  JOB #:  [id number]  CC:  Ismael Gonzalez MD       [facility]

## 2023-02-20 ENCOUNTER — OFFICE VISIT (OUTPATIENT)
Dept: SURGERY | Age: 68
End: 2023-02-20
Payer: MEDICARE

## 2023-02-20 VITALS
WEIGHT: 175.8 LBS | HEIGHT: 67 IN | SYSTOLIC BLOOD PRESSURE: 133 MMHG | DIASTOLIC BLOOD PRESSURE: 81 MMHG | BODY MASS INDEX: 27.59 KG/M2 | OXYGEN SATURATION: 97 % | HEART RATE: 60 BPM | TEMPERATURE: 97.7 F | RESPIRATION RATE: 16 BRPM

## 2023-02-20 DIAGNOSIS — K40.20 NON-RECURRENT BILATERAL INGUINAL HERNIA WITHOUT OBSTRUCTION OR GANGRENE: Primary | ICD-10-CM

## 2023-02-20 PROCEDURE — 3079F DIAST BP 80-89 MM HG: CPT | Performed by: SURGERY

## 2023-02-20 PROCEDURE — 1123F ACP DISCUSS/DSCN MKR DOCD: CPT | Performed by: SURGERY

## 2023-02-20 PROCEDURE — 3075F SYST BP GE 130 - 139MM HG: CPT | Performed by: SURGERY

## 2023-02-20 PROCEDURE — G8536 NO DOC ELDER MAL SCRN: HCPCS | Performed by: SURGERY

## 2023-02-20 PROCEDURE — 99204 OFFICE O/P NEW MOD 45 MIN: CPT | Performed by: SURGERY

## 2023-02-20 PROCEDURE — 1101F PT FALLS ASSESS-DOCD LE1/YR: CPT | Performed by: SURGERY

## 2023-02-20 PROCEDURE — G8419 CALC BMI OUT NRM PARAM NOF/U: HCPCS | Performed by: SURGERY

## 2023-02-20 PROCEDURE — G8427 DOCREV CUR MEDS BY ELIG CLIN: HCPCS | Performed by: SURGERY

## 2023-02-20 PROCEDURE — G8510 SCR DEP NEG, NO PLAN REQD: HCPCS | Performed by: SURGERY

## 2023-02-20 PROCEDURE — 3017F COLORECTAL CA SCREEN DOC REV: CPT | Performed by: SURGERY

## 2023-02-20 NOTE — PROGRESS NOTES
Verified Name and  of the patient. Chief Complaint   Patient presents with    Possible Hernia     Seen at the request of Dr Dontae Meza for possible Lt inguinal hernia       Health maintenance will be addressed with Primary Care Provider at next visit. Vitals:    23 0904   BP: 133/81   Pulse: 60   Resp: 16   Temp: 97.7 °F (36.5 °C)   TempSrc: Temporal   SpO2: 97%   Weight: 79.7 kg (175 lb 12.8 oz)   Height: 5' 7\" (1.702 m)   PainSc:   0 - No pain       1. Have you been to the ER, urgent care clinic since your last visit? Hospitalized since your last visit? No    2. Have you seen or consulted any other health care providers outside of the [address] [place] [address] since your last visit? Include any pap smears or colon screening.  No

## 2023-02-20 NOTE — PROGRESS NOTES
HISTORY OF PRESENT ILLNESS  Christopher Lemon is a 79 y.o. male who comes in for consultation by David Mcfarland MD and Dr Romero for a possible hernia  HPI  He has noted left groin swelling for about 5 months. He just noted it in the shower one day and does not recall an inciting event. He was a  but is retired. He denies associated pain, nausea, vomiting diarrhea, constipation, melena, hematochezia. He does have BPH with urinary hesitancy/frequency and incontinency. He has had UTIs. He has not had surgery in the area previously.          Past Medical History:   Diagnosis Date    Arthritis     Bladder mass     CAD (coronary artery disease)     [place]    Chronic back pain     Elevated PSA     GERD (gastroesophageal reflux disease)     Heart murmur     High cholesterol     History of heart attack 2015    Hypertension     Liver disease 1972    hepatitis at age 16    Psychiatric disorder     DEPRESSION AND ANXIETY    Seizures ([facility] [address] 11/2022    \"mini stroke w/ seizure\"    Stroke ([facility] [address] 11/2022    \"mini stroke w/ seizure\"     Past Surgical History:   Procedure Laterality Date    ENDOSCOPY VISIT-OUTPATIENT  01/01/2003    HX BACK SURGERY      lumbar surgery x 13    HX CORONARY STENT PLACEMENT      x2    HX HIP ARTHROSCOPY Bilateral     \"titanium gareth placement with lumbar surgery\"    CT UNLISTED PROCEDURE CARDIAC SURGERY       Family History   Problem Relation Age of Onset    Pneumonia Mother     Cancer Father         BRAIN    Brain cancer Father     Cancer Sister         rectal    Heart Disease Sister     Heart Attack Sister     Rectal Cancer Sister     Cancer Brother         blood cancer    Suicide Brother     Heart Disease Maternal Aunt     Stroke Maternal Aunt     Heart Disease Maternal Uncle     Stroke Maternal Uncle     Cancer Paternal Uncle         PROSTATE    Stroke Maternal Grandmother     Cancer Paternal Grandmother         BREAST     Social History     Tobacco Use    Smoking status: Every Day     Packs/day: 1.00     Years: 53.00     Pack years: 53.00     Types: Cigarettes    Smokeless tobacco: Never   Vaping Use    Vaping Use: Never used   Substance Use Topics    Alcohol use: Not Currently     Comment: quit drinking 1996; started in 2015 & stopped again in 2020    Drug use: Not Currently     Current Outpatient Medications   Medication Sig    ALPRAZolam (XANAX) 2 mg tablet Take 2 mg by mouth every six (6) hours as needed for Anxiety. atorvastatin (LIPITOR) 20 mg tablet Take 20 mg by mouth daily. HYDROmorphone (DILAUDID) 4 mg tablet Take 4 mg by mouth as needed for Pain. lisinopriL (PRINIVIL, ZESTRIL) 20 mg tablet Take 20 mg by mouth daily. mirabegron ER (MYRBETRIQ) 50 mg ER tablet Take 50 mg by mouth daily. pantoprazole (PROTONIX) 40 mg tablet Take 40 mg by mouth daily. terbinafine HCL (LAMISIL) 250 mg tablet Take 250 mg by mouth daily. zolpidem (AMBIEN) 10 mg tablet Take 10 mg by mouth nightly as needed for Sleep.    tamsulosin (FLOMAX) 0.4 mg capsule Take 0.4 mg by mouth daily. No current facility-administered medications for this visit. Allergies   Allergen Reactions    Latex Rash    Adhesive Rash    Codeine Rash, Itching and Nausea Only    Morphine Rash, Itching and Nausea Only       Review of Systems   Constitutional:  Negative for chills, diaphoresis, fever, malaise/fatigue and weight loss. HENT:  Negative for congestion, ear pain and sore throat. Eyes:  Negative for blurred vision and pain. Respiratory:  Negative for cough, hemoptysis, sputum production, shortness of breath, wheezing and stridor. Cardiovascular:  Negative for chest pain, palpitations, orthopnea, claudication, leg swelling and PND. Gastrointestinal:  Negative for abdominal pain, blood in stool, constipation, diarrhea, heartburn, melena, nausea and vomiting. Genitourinary:  Positive for frequency and urgency. Negative for dysuria, flank pain and hematuria.    Musculoskeletal: Positive for back pain. Negative for joint pain, myalgias and neck pain. Skin:  Negative for itching and rash. Neurological:  Negative for dizziness, tremors, focal weakness, seizures, weakness and headaches. Endo/Heme/Allergies:  Negative for polydipsia. Psychiatric/Behavioral:  Negative for depression and memory loss. The patient is not nervous/anxious. Visit Vitals  /81 (BP 1 Location: Left arm, BP Patient Position: Sitting, BP Cuff Size: Adult)   Pulse 60   Temp 97.7 °F (36.5 °C) (Temporal)   Resp 16   Ht 5' 7\" (1.702 m)   Wt 79.7 kg (175 lb 12.8 oz)   SpO2 97%   BMI 27.53 kg/m²       Physical Exam  Constitutional:       General: He is not in acute distress. Appearance: Normal appearance. He is well-developed. He is not diaphoretic. HENT:      Head: Normocephalic and atraumatic. Mouth/Throat:      Pharynx: No oropharyngeal exudate. Eyes:      General: No scleral icterus. Conjunctiva/sclera: Conjunctivae normal.      Pupils: Pupils are equal, round, and reactive to light. Neck:      Thyroid: No thyromegaly. Trachea: No tracheal deviation. Cardiovascular:      Rate and Rhythm: Normal rate and regular rhythm. Heart sounds: Normal heart sounds. No murmur heard. No friction rub. No gallop. Pulmonary:      Effort: Pulmonary effort is normal. No respiratory distress. Breath sounds: Normal breath sounds. No stridor. No wheezing or rales. Abdominal:      General: Bowel sounds are normal. There is no distension. Palpations: Abdomen is soft. There is no mass. Tenderness: There is no abdominal tenderness. There is no guarding or rebound. Hernia: A hernia is present. Hernia is present in the left inguinal area (medium sized reducible) and right inguinal area (small reducible). There is no hernia in the ventral area. Genitourinary:     Penis: Circumcised. Testes: Normal. Cremasteric reflex is present.    Musculoskeletal:         General: No tenderness. Normal range of motion. Cervical back: Normal range of motion and neck supple. Lymphadenopathy:      Cervical: No cervical adenopathy. Skin:     General: Skin is warm and dry. Findings: No erythema or rash. Neurological:      Mental Status: He is alert and oriented to person, place, and time. Cranial Nerves: No cranial nerve deficit. Coordination: Coordination normal.   Psychiatric:         Mood and Affect: Mood normal.         Behavior: Behavior normal.         Thought Content: Thought content normal.         Judgment: Judgment normal.       ASSESSMENT and PLAN   Bilateral L>R inguinal herniae. Asymptomatic. I explained about the anatomy and pathophysiology of hernias and the risk of incarceration and strangulation of the bowel. I explained about hernia repairs (open with and without mesh, and robotic assisted and laparoscopic with mesh). I explained the risks and benefits of repair including bleeding, infection, chronic pain, orchalgia, loss of testes, bowel or bladder injury, hernia recurrence, seroma, mesh infection requiring removal.  I explained it would be a six to eight week recuperation with no driving for 5 - 7 days, no lifting for six weeks. BPH with LUTS. Stopped flomax but on myrbetriq and still with some symptoms. He has follow up with Dr Alfie Obrien next week. Higher risk for post op urinary retention  CAD. Had MI 2015. Followed by Dr Shun Rodriguez at [facility]. He has no symptoms and has follow up in April  Hx miniCVA? AAA 3.1 cm  Tobacco abuse. Down to 1/2 ppd. Strongly encouraged cessation  Unvaccinated for Covid-19  Chronic back pain. Had pain pump previously.     GERD improved on PPI    He wishes to proceed with a laparoscopic totally extraperitoneal preperitoneal bilateral inguinal hernia repair with mesh under general anesthesia as an outpatient      Jerry Castañeda MD FACS

## 2023-04-24 ENCOUNTER — TELEPHONE (OUTPATIENT)
Dept: SURGERY | Age: 68
End: 2023-04-24

## 2023-04-24 NOTE — TELEPHONE ENCOUNTER
Attempted to reach patient to let him know that PAT has been trying to reach him regarding surgery instructions. Left message for a call back. Isaias Phoenix

## 2023-05-08 ENCOUNTER — TELEPHONE (OUTPATIENT)
Age: 68
End: 2023-05-08

## 2023-05-09 ENCOUNTER — TELEPHONE (OUTPATIENT)
Age: 68
End: 2023-05-09

## 2023-05-09 ENCOUNTER — CLINICAL DOCUMENTATION (OUTPATIENT)
Age: 68
End: 2023-05-09

## 2023-05-22 RX ORDER — TERBINAFINE HYDROCHLORIDE 250 MG/1
250 TABLET ORAL DAILY
COMMUNITY

## 2023-05-22 RX ORDER — ATORVASTATIN CALCIUM 20 MG/1
20 TABLET, FILM COATED ORAL DAILY
COMMUNITY

## 2023-05-22 RX ORDER — HYDROMORPHONE HYDROCHLORIDE 4 MG/1
4 TABLET ORAL DAILY
COMMUNITY

## 2023-05-22 RX ORDER — ZOLPIDEM TARTRATE 10 MG/1
10 TABLET ORAL NIGHTLY PRN
COMMUNITY

## 2023-05-22 RX ORDER — ALPRAZOLAM 2 MG/1
2 TABLET ORAL EVERY 6 HOURS PRN
COMMUNITY

## 2023-05-22 RX ORDER — TAMSULOSIN HYDROCHLORIDE 0.4 MG/1
0.4 CAPSULE ORAL DAILY
COMMUNITY

## 2023-05-22 RX ORDER — LISINOPRIL 20 MG/1
20 TABLET ORAL DAILY
COMMUNITY

## 2023-05-22 RX ORDER — PANTOPRAZOLE SODIUM 40 MG/1
40 TABLET, DELAYED RELEASE ORAL DAILY
COMMUNITY

## 2023-07-12 ENCOUNTER — TELEPHONE (OUTPATIENT)
Age: 68
End: 2023-07-12

## 2023-07-12 NOTE — TELEPHONE ENCOUNTER
Patient has a post-op appt scheduled for Tuesday, 7/18/2023. Surgery was cancelled due to illness. Attempted to reach out to pt to see how he was feeling and if he would like to follow up with Dr. Анна Thomason. Left message for a return call. Will hold his current appointment slot until I speak with him.

## 2023-07-18 ENCOUNTER — TELEPHONE (OUTPATIENT)
Age: 68
End: 2023-07-18

## (undated) DEVICE — 3M™ RANGER™ FLUID WARMING IRRIGATION SET, 24750, 10/CASE: Brand: 3M™ RANGER™

## (undated) DEVICE — SYRINGE IRRIG 60ML SFT PLIABLE BLB EZ TO GRP 1 HND USE W/

## (undated) DEVICE — GLOVE ORANGE PI 7 1/2   MSG9075

## (undated) DEVICE — SPONGE GZ W4XL4IN COT 12 PLY TYP VII WVN C FLD DSGN STERILE

## (undated) DEVICE — FORCEPS SURG L5IN S STL STR SERR HEMSTAT MOSQ

## (undated) DEVICE — GOWN,SIRUS,NONRNF,SETINSLV,XL,20/CS: Brand: MEDLINE

## (undated) DEVICE — OPEN-END URETERAL CATHETER: Brand: COOK

## (undated) DEVICE — SOLUTION IRRIG 3000ML 0.9% SOD CHL USP UROMATIC PLAS CONT

## (undated) DEVICE — CYSTO MRMC: Brand: MEDLINE INDUSTRIES, INC.